# Patient Record
Sex: FEMALE | Race: BLACK OR AFRICAN AMERICAN | Employment: OTHER | URBAN - METROPOLITAN AREA
[De-identification: names, ages, dates, MRNs, and addresses within clinical notes are randomized per-mention and may not be internally consistent; named-entity substitution may affect disease eponyms.]

---

## 2019-02-21 ENCOUNTER — OFFICE VISIT (OUTPATIENT)
Dept: CARDIOLOGY CLINIC | Age: 69
End: 2019-02-21

## 2019-02-21 VITALS
HEIGHT: 60 IN | BODY MASS INDEX: 35.73 KG/M2 | HEART RATE: 75 BPM | WEIGHT: 182 LBS | SYSTOLIC BLOOD PRESSURE: 146 MMHG | RESPIRATION RATE: 18 BRPM | OXYGEN SATURATION: 97 % | DIASTOLIC BLOOD PRESSURE: 78 MMHG

## 2019-02-21 DIAGNOSIS — R07.9 CHEST PAIN, UNSPECIFIED TYPE: ICD-10-CM

## 2019-02-21 DIAGNOSIS — E11.9 TYPE 2 DIABETES MELLITUS WITHOUT COMPLICATION, WITHOUT LONG-TERM CURRENT USE OF INSULIN (HCC): ICD-10-CM

## 2019-02-21 DIAGNOSIS — I10 ESSENTIAL HYPERTENSION: Primary | Chronic | ICD-10-CM

## 2019-02-21 DIAGNOSIS — E78.2 MIXED HYPERLIPIDEMIA: ICD-10-CM

## 2019-02-21 DIAGNOSIS — R00.1 BRADYCARDIA: ICD-10-CM

## 2019-02-21 PROBLEM — E66.01 SEVERE OBESITY (HCC): Status: ACTIVE | Noted: 2019-02-21

## 2019-02-21 RX ORDER — ONDANSETRON 4 MG/1
4 TABLET, FILM COATED ORAL
COMMUNITY
End: 2021-03-04 | Stop reason: ALTCHOICE

## 2019-02-21 RX ORDER — TIZANIDINE 4 MG/1
4 TABLET ORAL 2 TIMES WEEKLY
COMMUNITY
End: 2021-03-04 | Stop reason: ALTCHOICE

## 2019-02-21 RX ORDER — ASPIRIN 81 MG/1
TABLET ORAL DAILY
COMMUNITY

## 2019-02-21 RX ORDER — ERGOCALCIFEROL 1.25 MG/1
50000 CAPSULE ORAL
COMMUNITY
End: 2022-03-18 | Stop reason: SDUPTHER

## 2019-02-21 NOTE — PROGRESS NOTES
Garrett Sargent is a 70 y.o. female    Chief Complaint   Patient presents with    Follow-up     chest discomfort       1. Have you been to the ER, urgent care clinic since your last visit? Hospitalized since your last visit? No    2. Have you seen or consulted any other health care providers outside of the [address] [place] [address] since your last visit? Include any pap smears or colon screening.  No    PCP Dr. Modesto Groves is sending pt for CT exam for possible \"cancerous growth in ABD\"

## 2019-02-21 NOTE — PATIENT INSTRUCTIONS
Change Metoprolol XL to 25mg po daily. Start Amlodipine 5mg po daily. See Dr. Ever Beaulieu in 1 year.

## 2019-02-21 NOTE — PROGRESS NOTES
Reason for Consult: Chest discomfort. Referring: Magdaleno Proctor MD    HPI: Chris Haegn is a 70 y.o. female who has known history of hypertension, diabetes, dyslipidemia who I have seen in the past and last seen in 2016 is here for evaluation of symptoms of chest discomfort and follow-up since she has been lost to follow-up since then. The symptoms described atypical nonexertional off and on mostly at rest.  There is no other symptoms or shortness of breath, lightheadedness, dizziness, presyncope or syncope. EKG in the office demonstrated sinus bradycardia with heart rate of 55 bpm with nonspecific ST changes with normal axis with normal intervals. Plan:    1. Chest discomfort: This is chronic. This is atypical.  She had a cardiac catheterization in February 2016 which was negative for coronary artery disease. Further ischemic testing at this time is not going to be much productive. Continue symptomatic management as well as risk factor modification. Control blood pressure, hypertension and diabetes. 2.  Hypertension: Continue metoprolol although we will decrease the dose to 25 mg p.o. daily since her heart rate is low. Since we are reducing the metoprolol we will start her on amlodipine 5 mg p.o. Daily. 3.  Bradycardia: Reduce the dose of metoprolol to 25 mg p.o. Daily. 4.  Dyslipidemia: Continue statins. 5.  Follow-up with me in 1 year.         Past Medical History:   Diagnosis Date    Arthritis     Diabetes ([address]     Hyperlipemia     Hypertension             Past Surgical History:   Procedure Laterality Date    HX CARPAL TUNNEL RELEASE      HX HEENT      HX HYSTERECTOMY      HX KNEE ARTHROSCOPY               Family History   Problem Relation Age of Onset    Hypertension Other     Diabetes Other            Social History     Socioeconomic History    Marital status: LEGALLY      Spouse name: Not on file    Number of children: Not on file    Years of education: Not on file    Highest education level: Not on file   Social Needs    Financial resource strain: Not on file    Food insecurity - worry: Not on file    Food insecurity - inability: Not on file    Transportation needs - medical: Not on file   [employer] needs - non-medical: Not on file   Occupational History    Not on file   Tobacco Use    Smoking status: Former Smoker     Last attempt to quit: 1996     Years since quittin.0    Smokeless tobacco: Never Used   Substance and Sexual Activity    Alcohol use: No    Drug use: No    Sexual activity: Not on file   Other Topics Concern    Not on file   Social History Narrative    Not on file         Allergies   Allergen Reactions    Clindamycin Itching    Codeine Itching            Current Outpatient Medications   Medication Sig Dispense Refill    aspirin delayed-release 81 mg tablet Take  by mouth daily.  ergocalciferol (ERGOCALCIFEROL) 50,000 unit capsule Take 50,000 Units by mouth.  tiZANidine (ZANAFLEX) 4 mg tablet Take 4 mg by mouth two (2) times a week.  ondansetron hcl (ZOFRAN) 4 mg tablet Take 4 mg by mouth every eight (8) hours as needed for Nausea.  metoprolol succinate (TOPROL-XL) 50 mg XL tablet Take 50 mg by mouth nightly.  ipratropium (ATROVENT) 0.06 % nasal spray 2 Sprays by Both Nostrils route four (4) times daily as needed.  ranitidine (ZANTAC) 300 mg tablet Take 300 mg by mouth nightly.  losartan-hydrochlorothiazide (HYZAAR) 100-12.5 mg per tablet Take 1 Tab by mouth nightly.  simvastatin (ZOCOR) 20 mg tablet Take 20 mg by mouth nightly.  metFORMIN (GLUCOPHAGE) 500 mg tablet Take 500 mg by mouth two (2) times daily (with meals).  aspirin (ASPIRIN) 325 mg tablet Take 325 mg by mouth nightly.  latanoprost (XALATAN) 0.005 % ophthalmic solution Administer 1 Drop to both eyes daily.       L. acidoph & paracasei- S therm- Bifido (JEANNETTE-Q) 8 billion cell cap cap Take 1 Cap by mouth daily.      traMADol (ULTRAM) 50 mg tablet Take 50 mg by mouth daily as needed for Pain. ROS:  12 point review of systems was performed.  All negative except for HPI     Physical Exam:  Visit Vitals  /78 (BP 1 Location: Right arm, BP Patient Position: Sitting)   Pulse 75   Resp 18   Ht 5' (1.524 m)   Wt 182 lb (82.6 kg)   SpO2 97%   BMI 35.54 kg/m²       Gen:  Well-developed, well-nourished, in no acute distress  HEENT:  Pink conjunctivae, PERRL, hearing intact to voice, moist mucous membranes  Neck:  Supple, without masses, thyroid non-tender  Resp:  No accessory muscle use, clear breath sounds without wheezes rales or rhonchi  Card:  No murmurs, normal S1, S2 without thrills, bruits or peripheral edema  Abd:  Soft, non-tender, non-distended, normoactive bowel sounds are present, no palpable organomegaly and no detectable hernias  Lymph:  No cervical or inguinal adenopathy  Musc:  No cyanosis or clubbing  Skin:  No rashes or ulcers, skin turgor is good  Neuro:  Cranial nerves are grossly intact, no focal motor weakness, follows commands appropriately  Psych:  Good insight, oriented to person, place and time, alert     Labs:     Lab Results   Component Value Date/Time    WBC 7.9 02/03/2016 06:30 AM    HGB 12.2 02/03/2016 06:30 AM    Hemoglobin (POC) 13.9 12/20/2014 10:20 AM    HCT 36.5 02/03/2016 06:30 AM    Hematocrit (POC) 41 12/20/2014 10:20 AM    PLATELET 514 86/54/1314 06:30 AM    MCV 83.7 02/03/2016 06:30 AM     Lab Results   Component Value Date/Time    Hemoglobin A1c 6.5 (H) 02/03/2016 06:30 AM    Glucose 117 (H) 02/03/2016 06:30 AM    Glucose (POC) 100 02/03/2016 12:50 PM    LDL, calculated 81.8 02/03/2016 06:30 AM    Creatinine (POC) 1.0 12/20/2014 10:20 AM    Creatinine 0.91 02/03/2016 06:30 AM      Lab Results   Component Value Date/Time    Cholesterol, total 154 02/03/2016 06:30 AM    HDL Cholesterol 45 02/03/2016 06:30 AM    LDL, calculated 81.8 02/03/2016 06:30 AM    Triglyceride 136 02/03/2016 06:30 AM    CHOL/HDL Ratio 3.4 02/03/2016 06:30 AM     Lab Results   Component Value Date/Time    ALT (SGPT) 16 02/03/2016 06:30 AM    AST (SGOT) 13 (L) 02/03/2016 06:30 AM    Alk.  phosphatase 84 02/03/2016 06:30 AM    Bilirubin, direct 0.2 02/03/2016 06:30 AM    Bilirubin, total 1.0 02/03/2016 06:30 AM    Albumin 3.2 (L) 02/03/2016 06:30 AM    Protein, total 6.1 (L) 02/03/2016 06:30 AM    PLATELET 533 98/11/4624 06:30 AM     No results found for: INR, PTMR, PTP, PT1, PT2   Lab Results   Component Value Date/Time    GFR est non-AA >60 02/03/2016 06:30 AM    GFRNA, POC 56 (L) 12/20/2014 10:20 AM    GFR est AA >60 02/03/2016 06:30 AM    GFRAA, POC >60 12/20/2014 10:20 AM    Creatinine 0.91 02/03/2016 06:30 AM    Creatinine (POC) 1.0 12/20/2014 10:20 AM    BUN 18 02/03/2016 06:30 AM    BUN (POC) 17 12/20/2014 10:20 AM    Sodium 142 02/03/2016 06:30 AM    Sodium (POC) 138 12/20/2014 10:20 AM    Potassium 3.7 02/03/2016 06:30 AM    Potassium (POC) 3.7 12/20/2014 10:20 AM    Chloride 106 02/03/2016 06:30 AM    Chloride (POC) 101 12/20/2014 10:20 AM    CO2 29 02/03/2016 06:30 AM    Magnesium 1.9 02/03/2016 06:30 AM    Phosphorus 3.9 02/03/2016 06:30 AM     No results found for: PSA, PSA2, PSAR1, PSA1, PSAR2, PSA3, PSAR3, [id number], [id number], PSALT  No results found for: TSH, TSH2, TSH3, TSHP, TSHELE, TSHEXT, TT3, T3U, T3UP, FRT3, FT3, FT4, FT4P, T4, T4P, FT4T, TT7, TSHEXT   Lab Results   Component Value Date/Time    Glucose 117 (H) 02/03/2016 06:30 AM    Glucose (POC) 100 02/03/2016 12:50 PM      Lab Results   Component Value Date/Time    CK 68 02/03/2016 06:30 AM    CK - MB 0.7 02/03/2016 06:30 AM    CK-MB Index 1.0 02/03/2016 06:30 AM    Troponin-I, Qt. <0.04 02/03/2016 06:30 AM      No results found for: BNP, BNPP, BNPPPOC, XBNPT, BNPNT   Lab Results   Component Value Date/Time    Sodium 142 02/03/2016 06:30 AM    Potassium 3.7 02/03/2016 06:30 AM    Chloride 106 02/03/2016 06:30 AM    CO2 29 02/03/2016 06:30 AM    Anion gap 7 02/03/2016 06:30 AM    Glucose 117 (H) 02/03/2016 06:30 AM    BUN 18 02/03/2016 06:30 AM    Creatinine 0.91 02/03/2016 06:30 AM    BUN/Creatinine ratio 20 02/03/2016 06:30 AM    GFR est AA >60 02/03/2016 06:30 AM    GFR est non-AA >60 02/03/2016 06:30 AM    Calcium 8.6 02/03/2016 06:30 AM      Lab Results   Component Value Date/Time    Sodium 142 02/03/2016 06:30 AM    Potassium 3.7 02/03/2016 06:30 AM    Chloride 106 02/03/2016 06:30 AM    CO2 29 02/03/2016 06:30 AM    Anion gap 7 02/03/2016 06:30 AM    Glucose 117 (H) 02/03/2016 06:30 AM    BUN 18 02/03/2016 06:30 AM    Creatinine 0.91 02/03/2016 06:30 AM    BUN/Creatinine ratio 20 02/03/2016 06:30 AM    GFR est AA >60 02/03/2016 06:30 AM    GFR est non-AA >60 02/03/2016 06:30 AM    Calcium 8.6 02/03/2016 06:30 AM    Bilirubin, total 1.0 02/03/2016 06:30 AM    ALT (SGPT) 16 02/03/2016 06:30 AM    AST (SGOT) 13 (L) 02/03/2016 06:30 AM    Alk. phosphatase 84 02/03/2016 06:30 AM    Protein, total 6.1 (L) 02/03/2016 06:30 AM    Albumin 3.2 (L) 02/03/2016 06:30 AM    Globulin 2.9 02/03/2016 06:30 AM    A-G Ratio 1.1 02/03/2016 06:30 AM      Lab Results   Component Value Date/Time    Hemoglobin A1c 6.5 (H) 02/03/2016 06:30 AM         No results for input(s): CPK, CKMB, TROIQ in the last 72 hours. No lab exists for component: CKQMB, CPKMB        Problem List:     Problem List  Date Reviewed: 2/22/2016          Codes Class Noted    Severe obesity ([facility] ICD-10-CM: E66.01  ICD-9-CM: 278.01  2/21/2019        Hyperlipemia ICD-10-CM: E78.5  ICD-9-CM: 272.4  Unknown        Arthritis ICD-10-CM: M19.90  ICD-9-CM: 716.90  Unknown        Chest pain ICD-10-CM: R07.9  ICD-9-CM: 786.50  2/2/2016        Hypertension (Chronic) ICD-10-CM: I10  ICD-9-CM: 401.9  2/2/2016        DM type 2 (diabetes mellitus, type 2) ([facility] (Chronic) ICD-10-CM: E11.9  ICD-9-CM: 250.00  2/2/2016                Omi Oakley MD, [facility] [place]

## 2019-02-22 RX ORDER — AMLODIPINE BESYLATE 5 MG/1
5 TABLET ORAL DAILY
Qty: 90 TAB | Refills: 0 | Status: SHIPPED | OUTPATIENT
Start: 2019-02-22 | End: 2019-05-26 | Stop reason: SDUPTHER

## 2019-02-25 ENCOUNTER — TELEPHONE (OUTPATIENT)
Dept: CARDIOLOGY CLINIC | Age: 69
End: 2019-02-25

## 2019-02-25 RX ORDER — METOPROLOL SUCCINATE 25 MG/1
25 TABLET, EXTENDED RELEASE ORAL
Qty: 90 TAB | Refills: 3 | Status: SHIPPED | OUTPATIENT
Start: 2019-02-25

## 2019-02-25 NOTE — TELEPHONE ENCOUNTER
Patient states Dr. Jet Verdugo changed the dosage on her metoprolol, but her pharmacy did not receive the new prescription.      Phone: [phone]

## 2019-05-28 RX ORDER — AMLODIPINE BESYLATE 5 MG/1
TABLET ORAL
Qty: 90 TAB | Refills: 3 | Status: SHIPPED | OUTPATIENT
Start: 2019-05-28 | End: 2020-03-02

## 2019-05-28 NOTE — TELEPHONE ENCOUNTER
Refill of amlodipine 5 mg daily completed per VO of Dr. Chidi Arias.     Last OV: 2/2019  Next OV: 2/2020

## 2020-02-27 ENCOUNTER — OFFICE VISIT (OUTPATIENT)
Dept: CARDIOLOGY CLINIC | Age: 70
End: 2020-02-27

## 2020-02-27 VITALS
OXYGEN SATURATION: 93 % | SYSTOLIC BLOOD PRESSURE: 126 MMHG | BODY MASS INDEX: 34.63 KG/M2 | HEART RATE: 67 BPM | HEIGHT: 60 IN | WEIGHT: 176.4 LBS | DIASTOLIC BLOOD PRESSURE: 72 MMHG

## 2020-02-27 DIAGNOSIS — E11.9 TYPE 2 DIABETES MELLITUS WITHOUT COMPLICATION, WITHOUT LONG-TERM CURRENT USE OF INSULIN (HCC): ICD-10-CM

## 2020-02-27 DIAGNOSIS — E78.2 MIXED HYPERLIPIDEMIA: ICD-10-CM

## 2020-02-27 DIAGNOSIS — R00.1 BRADYCARDIA: ICD-10-CM

## 2020-02-27 DIAGNOSIS — I10 ESSENTIAL HYPERTENSION: Primary | ICD-10-CM

## 2020-02-27 RX ORDER — PANTOPRAZOLE SODIUM 40 MG/1
TABLET, DELAYED RELEASE ORAL
COMMUNITY
End: 2022-03-18

## 2020-02-27 NOTE — PROGRESS NOTES
Office Follow-up    NAME: Linda Daly   :  1950  MRM:  [id number]    Date:  2020            Assessment:     Problem List  Date Reviewed: 2020          Codes Class Noted    Severe obesity ([facility] ICD-10-CM: E66.01  ICD-9-CM: 278.01  2019        Hyperlipemia ICD-10-CM: E78.5  ICD-9-CM: 272.4  Unknown        Arthritis ICD-10-CM: M19.90  ICD-9-CM: 716.90  Unknown        Chest pain ICD-10-CM: R07.9  ICD-9-CM: 786.50  2016        Hypertension (Chronic) ICD-10-CM: I10  ICD-9-CM: 401.9  2016        DM type 2 (diabetes mellitus, type 2) ([facility] (Chronic) ICD-10-CM: E11.9  ICD-9-CM: 250.00  2016                 Plan:   Plan:    1. Chest discomfort: This is chronic. This is atypical.  She had a cardiac catheterization in 2016 which was negative for coronary artery disease. Continue current meds. Control blood pressure, hypertension and diabetes. 2.  Hypertension: Continue metoprolol and amlodipine. In the past we had reduced metoprolol dosage because of bradycardia. Her heart rate has since then improved. 3.  Bradycardia: This is improved since reducing the dosage of metoprolol to 25 mg p.o. daily. 4.  Dyslipidemia: Continue statins. 5.  Follow-up with me in 1 year. ATTENTION:   This medical record was transcribed using an electronic medical records/speech recognition system. Although proofread, it may and can contain electronic, spelling and other errors. Corrections may be executed at a later time. Please feel free to contact us for any clarifications as needed. Subjective:     Linda Daly, a 78y.o. year-old who presents for followup of. She has history of hypertension, dyslipidemia and bradycardia. She has chronic atypical chest pain. She is returning today for one-year follow-up. Denies any symptoms of chest pain, shortness of breath, lightheadedness or dizziness.     EKG today demonstrated normal sinus rhythm with normal axis with normal intervals with normal ST segment. Exam:     Physical Exam:  Visit Vitals  /72 (BP 1 Location: Left arm, BP Patient Position: Sitting)   Pulse 67   Ht 5' (1.524 m)   Wt 176 lb 6.4 oz (80 kg)   SpO2 93%   BMI 34.45 kg/m²     General appearance - alert, well appearing, and in no distress  Mental status - affect appropriate to mood  Eyes - sclera anicteric, moist mucous membranes  Neck - supple, no significant adenopathy  Chest - clear to auscultation, no wheezes, rales or rhonchi  Heart - normal rate, regular rhythm, normal S1, S2, no murmurs, rubs, clicks or gallops  Abdomen - soft, nontender, nondistended, no masses or organomegaly  Extremities - peripheral pulses normal, no pedal edema  Skin - normal coloration  no rashes    Medications:     Current Outpatient Medications   Medication Sig    pantoprazole (PROTONIX) 40 mg tablet pantoprazole 40 mg tablet,delayed release    amLODIPine (NORVASC) 5 mg tablet take 1 tablet by mouth once daily    metoprolol succinate (TOPROL-XL) 25 mg XL tablet Take 1 Tab by mouth nightly.  aspirin delayed-release 81 mg tablet Take  by mouth daily.  ergocalciferol (ERGOCALCIFEROL) 50,000 unit capsule Take 50,000 Units by mouth.  tiZANidine (ZANAFLEX) 4 mg tablet Take 4 mg by mouth two (2) times a week.  ondansetron hcl (ZOFRAN) 4 mg tablet Take 4 mg by mouth every eight (8) hours as needed for Nausea.  aspirin (ASPIRIN) 325 mg tablet Take 325 mg by mouth nightly.  latanoprost (XALATAN) 0.005 % ophthalmic solution Administer 1 Drop to both eyes daily.  L. acidoph & paracasei- S therm- Bifido (JEANNETTE-Q) 8 billion cell cap cap Take 1 Cap by mouth daily.  traMADol (ULTRAM) 50 mg tablet Take 50 mg by mouth daily as needed for Pain.  ipratropium (ATROVENT) 0.06 % nasal spray 2 Sprays by Both Nostrils route four (4) times daily as needed.  losartan-hydrochlorothiazide (HYZAAR) 100-12.5 mg per tablet Take 1 Tab by mouth nightly.     simvastatin (ZOCOR) 20 mg tablet Take 20 mg by mouth nightly.  metFORMIN (GLUCOPHAGE) 500 mg tablet Take 500 mg by mouth two (2) times daily (with meals).  ranitidine (ZANTAC) 300 mg tablet Take 300 mg by mouth nightly. No current facility-administered medications for this visit. Diagnostic Data Review:       [facility]: 2/3/16- L Main: Med; Long; NML. LAD: Med prox/ Small distal ; MLI; D1 nml. LCflex: Med; MLI; OM1/OM2 - MLI. RCA: Small to Med; MLI; PDA and PLB - small. LVEDP: 14      Lab Review:     Lab Results   Component Value Date/Time    Cholesterol, total 154 02/03/2016 06:30 AM    HDL Cholesterol 45 02/03/2016 06:30 AM    LDL, calculated 81.8 02/03/2016 06:30 AM    Triglyceride 136 02/03/2016 06:30 AM    CHOL/HDL Ratio 3.4 02/03/2016 06:30 AM     Lab Results   Component Value Date/Time    Creatinine (POC) 1.0 12/20/2014 10:20 AM    Creatinine 0.91 02/03/2016 06:30 AM     Lab Results   Component Value Date/Time    BUN 18 02/03/2016 06:30 AM    BUN (POC) 17 12/20/2014 10:20 AM     Lab Results   Component Value Date/Time    Potassium 3.7 02/03/2016 06:30 AM     Lab Results   Component Value Date/Time    Hemoglobin A1c 6.5 (H) 02/03/2016 06:30 AM     Lab Results   Component Value Date/Time    Hemoglobin (POC) 13.9 12/20/2014 10:20 AM    HGB 12.2 02/03/2016 06:30 AM     Lab Results   Component Value Date/Time    PLATELET 194 42/52/8846 06:30 AM     No results for input(s): CPK, CKMB, TROIQ in the last 72 hours. No lab exists for component: CKQMB, CPKMB             ___________________________________________________    Cuco Jarvis.  Cm Rowell MD, [facility] [place]

## 2020-02-27 NOTE — PROGRESS NOTES
Day Cho is a 78 y.o. female    Chief Complaint   Patient presents with    Annual Exam     horacio    Hypertension    Cholesterol Problem       Chest pain No    SOB No    Dizziness No    Swelling No    Refills amlodipine and metorolol    Visit Vitals  /72 (BP 1 Location: Left arm, BP Patient Position: Sitting)   Pulse 67   Ht 5' (1.524 m)   Wt 176 lb 6.4 oz (80 kg)   SpO2 93%   BMI 34.45 kg/m²       1. Have you been to the ER, urgent care clinic since your last visit? Hospitalized since your last visit? No    2. Have you seen or consulted any other health care providers outside of the [address] [place] [address] since your last visit? Include any pap smears or colon screening.   No

## 2020-03-02 RX ORDER — AMLODIPINE BESYLATE 5 MG/1
TABLET ORAL
Qty: 90 TAB | Refills: 3 | Status: SHIPPED | OUTPATIENT
Start: 2020-03-02

## 2020-03-02 NOTE — TELEPHONE ENCOUNTER
Per VO of Dr. Sofia Garcia: 2/21/2019    Future Appointments   Date Time Provider Hany Linton   3/4/2021 10:20 AM Marina Diaz MD [facility] SCHED       Requested Prescriptions     Signed Prescriptions Disp Refills    amLODIPine (NORVASC) 5 mg tablet 90 Tab 3     Sig: TAKE 1 TABLET BY MOUTH ONCE DAILY     Authorizing Provider: Srinivas Devi     Ordering User: Bella Elliott

## 2021-03-04 ENCOUNTER — TELEPHONE (OUTPATIENT)
Dept: CARDIOLOGY CLINIC | Age: 71
End: 2021-03-04

## 2021-03-04 ENCOUNTER — OFFICE VISIT (OUTPATIENT)
Dept: CARDIOLOGY CLINIC | Age: 71
End: 2021-03-04
Payer: MEDICARE

## 2021-03-04 VITALS
SYSTOLIC BLOOD PRESSURE: 130 MMHG | HEART RATE: 55 BPM | DIASTOLIC BLOOD PRESSURE: 76 MMHG | BODY MASS INDEX: 34.36 KG/M2 | OXYGEN SATURATION: 97 % | HEIGHT: 60 IN | WEIGHT: 175 LBS

## 2021-03-04 DIAGNOSIS — E78.2 MIXED HYPERLIPIDEMIA: ICD-10-CM

## 2021-03-04 DIAGNOSIS — I10 ESSENTIAL HYPERTENSION: ICD-10-CM

## 2021-03-04 DIAGNOSIS — R00.1 BRADYCARDIA: Primary | ICD-10-CM

## 2021-03-04 PROCEDURE — 3017F COLORECTAL CA SCREEN DOC REV: CPT | Performed by: INTERNAL MEDICINE

## 2021-03-04 PROCEDURE — G8419 CALC BMI OUT NRM PARAM NOF/U: HCPCS | Performed by: INTERNAL MEDICINE

## 2021-03-04 PROCEDURE — G8752 SYS BP LESS 140: HCPCS | Performed by: INTERNAL MEDICINE

## 2021-03-04 PROCEDURE — G8428 CUR MEDS NOT DOCUMENT: HCPCS | Performed by: INTERNAL MEDICINE

## 2021-03-04 PROCEDURE — G8400 PT W/DXA NO RESULTS DOC: HCPCS | Performed by: INTERNAL MEDICINE

## 2021-03-04 PROCEDURE — 1101F PT FALLS ASSESS-DOCD LE1/YR: CPT | Performed by: INTERNAL MEDICINE

## 2021-03-04 PROCEDURE — G8754 DIAS BP LESS 90: HCPCS | Performed by: INTERNAL MEDICINE

## 2021-03-04 PROCEDURE — G8536 NO DOC ELDER MAL SCRN: HCPCS | Performed by: INTERNAL MEDICINE

## 2021-03-04 PROCEDURE — 1090F PRES/ABSN URINE INCON ASSESS: CPT | Performed by: INTERNAL MEDICINE

## 2021-03-04 PROCEDURE — 99214 OFFICE O/P EST MOD 30 MIN: CPT | Performed by: INTERNAL MEDICINE

## 2021-03-04 PROCEDURE — G8432 DEP SCR NOT DOC, RNG: HCPCS | Performed by: INTERNAL MEDICINE

## 2021-03-04 PROCEDURE — 93000 ELECTROCARDIOGRAM COMPLETE: CPT | Performed by: INTERNAL MEDICINE

## 2021-03-04 RX ORDER — FLUOCINONIDE 0.5 MG/G
CREAM TOPICAL 2 TIMES DAILY
COMMUNITY

## 2021-03-04 RX ORDER — LISINOPRIL AND HYDROCHLOROTHIAZIDE 20; 25 MG/1; MG/1
TABLET ORAL DAILY
COMMUNITY
End: 2021-05-03 | Stop reason: ALTCHOICE

## 2021-03-04 RX ORDER — GABAPENTIN 300 MG/1
300 CAPSULE ORAL AS NEEDED
COMMUNITY

## 2021-03-04 RX ORDER — HYDROXYZINE 25 MG/1
TABLET, FILM COATED ORAL
COMMUNITY

## 2021-03-04 NOTE — PROGRESS NOTES
Gulshan Renteria is a 69 y.o. female    Visit Vitals  /76 (BP 1 Location: Left upper arm, BP Patient Position: Sitting, BP Cuff Size: Adult)   Pulse (!) 55   Ht 5' (1.524 m)   Wt 175 lb (79.4 kg)   SpO2 97%   BMI 34.18 kg/m²       Chief Complaint   Patient presents with    Hypertension    Cholesterol Problem    Irregular Heart Beat     SHERRI       Chest pain NO  SOB NO  Dizziness NO  Swelling NO  Recent hospital visit NO  Refills NO

## 2021-03-04 NOTE — TELEPHONE ENCOUNTER
Patient would like to speak with the urse to go over the list of current medications she is on per Dr. Derek Winslow request. Please advise.      Phone:  [phone]

## 2021-03-04 NOTE — TELEPHONE ENCOUNTER
Returned pt call, ID [id number]. Pt stated that she had already spoken toa nurse to review medication. Pt has no further questions or concerns at this time.

## 2021-03-04 NOTE — PROGRESS NOTES
Office Follow-up    NAME: Anaya Musa   :  1950  MRM:  [id number]    Date:  3/4/2021            Assessment:     Problem List  Date Reviewed: 2020          Codes Class Noted    Severe obesity ([facility].) ICD-10-CM: E66.01  ICD-9-CM: 278.01  2019        Hyperlipemia ICD-10-CM: E78.5  ICD-9-CM: 272.4  Unknown        Arthritis ICD-10-CM: M19.90  ICD-9-CM: 716.90  Unknown        Chest pain ICD-10-CM: R07.9  ICD-9-CM: 786.50  2016        Hypertension (Chronic) ICD-10-CM: I10  ICD-9-CM: 401.9  2016        DM type 2 (diabetes mellitus, type 2) ([facility].) (Chronic) ICD-10-CM: E11.9  ICD-9-CM: 250.00  2016                 Plan:   Plan:    1. Chest discomfort: Currently she does not have any significant chest pain. In past she has had cardiac catheterization in 2016 with negative CAD. 2.  Hypertension: Blood pressure is controlled. Continue current medications however her list describes having lisinopril HCTZ as well as losartan HCTZ. She does not recall if she is taking both of them. She will call her office and let us know which when she is taking. 3.  Bradycardia: This is improved since reducing the dosage of metoprolol to 25 mg p.o. daily. 4.  Dyslipidemia: Continue statins. 5.  Follow-up with me in 1 year. ATTENTION:   This medical record was transcribed using an electronic medical records/speech recognition system. Although proofread, it may and can contain electronic, spelling and other errors. Corrections may be executed at a later time. Please feel free to contact us for any clarifications as needed. Subjective:     Anaya Musa, a 69y.o. year-old who presents for followup. She has prior history of hypertension, dyslipidemia, bradycardia, PVCs, and chest pain. She is returning today for 1 year follow-up. Denies any symptoms of chest pain, shortness of breath, lightheadedness or dizziness.     EKG today demonstrates sinus bradycardia with heart rate of 55 bpm with occasional PVC. Exam:     Physical Exam:  Visit Vitals  /76 (BP 1 Location: Left upper arm, BP Patient Position: Sitting, BP Cuff Size: Adult)   Pulse (!) 55   Ht 5' (1.524 m)   Wt 175 lb (79.4 kg)   SpO2 97%   BMI 34.18 kg/m²     General appearance - alert, well appearing, and in no distress  Mental status - affect appropriate to mood  Eyes - sclera anicteric, moist mucous membranes  Neck - supple, no significant adenopathy  Chest - clear to auscultation, no wheezes, rales or rhonchi  Heart - normal rate, regular rhythm, normal S1, S2, no murmurs, rubs, clicks or gallops  Abdomen - soft, nontender, nondistended, no masses or organomegaly  Extremities - peripheral pulses normal, no pedal edema  Skin - normal coloration  no rashes    Medications:     Current Outpatient Medications   Medication Sig    lisinopril-hydroCHLOROthiazide (PRINZIDE, ZESTORETIC) 20-25 mg per tablet Take  by mouth daily.  hydrOXYzine HCL (ATARAX) 25 mg tablet Take  by mouth three (3) times daily as needed.  gabapentin (NEURONTIN) 300 mg capsule Take 300 mg by mouth three (3) times daily.  fluocinoNIDE (LIDEX) 0.05 % topical cream Apply  to affected area two (2) times a day.  amLODIPine (NORVASC) 5 mg tablet TAKE 1 TABLET BY MOUTH ONCE DAILY    pantoprazole (PROTONIX) 40 mg tablet pantoprazole 40 mg tablet,delayed release    metoprolol succinate (TOPROL-XL) 25 mg XL tablet Take 1 Tab by mouth nightly.  aspirin delayed-release 81 mg tablet Take  by mouth daily.  ergocalciferol (ERGOCALCIFEROL) 50,000 unit capsule Take 50,000 Units by mouth.  latanoprost (XALATAN) 0.005 % ophthalmic solution Administer 1 Drop to both eyes daily.  ipratropium (ATROVENT) 0.06 % nasal spray 2 Sprays by Both Nostrils route four (4) times daily as needed.  losartan-hydrochlorothiazide (HYZAAR) 100-12.5 mg per tablet Take 1 Tab by mouth nightly.  simvastatin (ZOCOR) 20 mg tablet Take 20 mg by mouth nightly.     metFORMIN (GLUCOPHAGE) 500 mg tablet Take 500 mg by mouth two (2) times daily (with meals).  tiZANidine (ZANAFLEX) 4 mg tablet Take 4 mg by mouth two (2) times a week.  ondansetron hcl (ZOFRAN) 4 mg tablet Take 4 mg by mouth every eight (8) hours as needed for Nausea.  L. acidoph & paracasei- S therm- Bifido (JEANNETTE-Q) 8 billion cell cap cap Take 1 Cap by mouth daily.  traMADol (ULTRAM) 50 mg tablet Take 50 mg by mouth daily as needed for Pain. No current facility-administered medications for this visit. Diagnostic Data Review:       [facility]: 2/3/16- L Main: Med; Long; NML. LAD: Med prox/ Small distal ; MLI; D1 nml. LCflex: Med; MLI; OM1/OM2 - MLI. RCA: Small to Med; MLI; PDA and PLB - small. LVEDP: 14      Lab Review:     Lab Results   Component Value Date/Time    Cholesterol, total 154 02/03/2016 06:30 AM    HDL Cholesterol 45 02/03/2016 06:30 AM    LDL, calculated 81.8 02/03/2016 06:30 AM    Triglyceride 136 02/03/2016 06:30 AM    CHOL/HDL Ratio 3.4 02/03/2016 06:30 AM     Lab Results   Component Value Date/Time    Creatinine (POC) 1.0 12/20/2014 10:20 AM    Creatinine 0.91 02/03/2016 06:30 AM     Lab Results   Component Value Date/Time    BUN 18 02/03/2016 06:30 AM    BUN (POC) 17 12/20/2014 10:20 AM     Lab Results   Component Value Date/Time    Potassium 3.7 02/03/2016 06:30 AM     Lab Results   Component Value Date/Time    Hemoglobin A1c 6.5 (H) 02/03/2016 06:30 AM     Lab Results   Component Value Date/Time    Hemoglobin (POC) 13.9 12/20/2014 10:20 AM    HGB 12.2 02/03/2016 06:30 AM     Lab Results   Component Value Date/Time    PLATELET 881 50/62/9473 06:30 AM     No results for input(s): CPK, CKMB, TROIQ in the last 72 hours. No lab exists for component: CKQMB, CPKMB             ___________________________________________________    Lisa Gumaan.  Foreign Edwards MD, [facility] [place]

## 2021-04-29 ENCOUNTER — OFFICE VISIT (OUTPATIENT)
Dept: CARDIOLOGY CLINIC | Age: 71
End: 2021-04-29
Payer: MEDICARE

## 2021-04-29 VITALS
OXYGEN SATURATION: 98 % | RESPIRATION RATE: 16 BRPM | HEIGHT: 60 IN | HEART RATE: 68 BPM | WEIGHT: 177 LBS | SYSTOLIC BLOOD PRESSURE: 156 MMHG | DIASTOLIC BLOOD PRESSURE: 74 MMHG | BODY MASS INDEX: 34.75 KG/M2

## 2021-04-29 DIAGNOSIS — E78.2 MIXED HYPERLIPIDEMIA: ICD-10-CM

## 2021-04-29 DIAGNOSIS — I10 ESSENTIAL HYPERTENSION: Primary | ICD-10-CM

## 2021-04-29 DIAGNOSIS — R07.9 CHEST PAIN, UNSPECIFIED TYPE: ICD-10-CM

## 2021-04-29 PROCEDURE — G8400 PT W/DXA NO RESULTS DOC: HCPCS | Performed by: INTERNAL MEDICINE

## 2021-04-29 PROCEDURE — G8536 NO DOC ELDER MAL SCRN: HCPCS | Performed by: INTERNAL MEDICINE

## 2021-04-29 PROCEDURE — 99214 OFFICE O/P EST MOD 30 MIN: CPT | Performed by: INTERNAL MEDICINE

## 2021-04-29 PROCEDURE — G8754 DIAS BP LESS 90: HCPCS | Performed by: INTERNAL MEDICINE

## 2021-04-29 PROCEDURE — 3017F COLORECTAL CA SCREEN DOC REV: CPT | Performed by: INTERNAL MEDICINE

## 2021-04-29 PROCEDURE — G8432 DEP SCR NOT DOC, RNG: HCPCS | Performed by: INTERNAL MEDICINE

## 2021-04-29 PROCEDURE — G8753 SYS BP > OR = 140: HCPCS | Performed by: INTERNAL MEDICINE

## 2021-04-29 PROCEDURE — 1101F PT FALLS ASSESS-DOCD LE1/YR: CPT | Performed by: INTERNAL MEDICINE

## 2021-04-29 PROCEDURE — G8428 CUR MEDS NOT DOCUMENT: HCPCS | Performed by: INTERNAL MEDICINE

## 2021-04-29 PROCEDURE — G8417 CALC BMI ABV UP PARAM F/U: HCPCS | Performed by: INTERNAL MEDICINE

## 2021-04-29 PROCEDURE — 1090F PRES/ABSN URINE INCON ASSESS: CPT | Performed by: INTERNAL MEDICINE

## 2021-04-29 NOTE — PROGRESS NOTES
Office Follow-up    NAME: Chaz Cuellar   :  1950  MRM:  [id number]    Date:  2021            Assessment:     Problem List  Date Reviewed: 2020          Codes Class Noted    Severe obesity ([facility].) ICD-10-CM: E66.01  ICD-9-CM: 278.01  2019        Hyperlipemia ICD-10-CM: E78.5  ICD-9-CM: 272.4  Unknown        Arthritis ICD-10-CM: M19.90  ICD-9-CM: 716.90  Unknown        Chest pain ICD-10-CM: R07.9  ICD-9-CM: 786.50  2016        Hypertension (Chronic) ICD-10-CM: I10  ICD-9-CM: 401.9  2016        DM type 2 (diabetes mellitus, type 2) ([facility] 75.) (Chronic) ICD-10-CM: E11.9  ICD-9-CM: 250.00  2016                 Plan:     1. Chest discomfort: In past she has had a cardiac catheterization in  which only demonstrated mild luminal irregularities. She does have risk factors including her personal history of hypertension and dyslipidemia as well as family history of her father having triple bypass. We will proceed with a Lexiscan stress nuclear study. 2.  Hypertension: Blood pressure is mostly controlled although slightly elevated today. Continue lisinopril and HCTZ. There is some confusion about she being on also losartan HCTZ. 3.  Bradycardia: This is improved since reducing the dosage of metoprolol to 25 mg p.o. daily. 4.  Dyslipidemia: Continue statins. 5.  Phone follow-up of the rate stress test.  Otherwise follow-up with me in 1 year. ATTENTION:   This medical record was transcribed using an electronic medical records/speech recognition system. Although proofread, it may and can contain electronic, spelling and other errors. Corrections may be executed at a later time. Please feel free to contact us for any clarifications as needed. Subjective:     Chaz Cuellar, a 66y.o. year-old who presents for followup. She has prior history of hypertension, dyslipidemia, bradycardia, PVCs, and chest pain.   I had recently seen her about a month ago for follow-up at that time she was doing fine however in past few days she has started experiencing some chest discomfort which she describes as pins-and-needles or aching sensation sometimes in the anterior left chest wall especially when she is exerting herself. She also has noticed the symptoms when she is in stress. No associated symptoms of shortness of breath, lightheadedness or dizziness. EKG from 3/4/2021 demonstrates sinus bradycardia with heart rate of 55 bpm with occasional PVC. Exam:     Physical Exam:  Visit Vitals  BP (!) 156/74 (BP 1 Location: Left arm, BP Patient Position: Sitting, BP Cuff Size: Large adult)   Pulse 68   Resp 16   Ht 5' (1.524 m)   Wt 177 lb (80.3 kg)   SpO2 98%   BMI 34.57 kg/m²     General appearance - alert, well appearing, and in no distress  Mental status - affect appropriate to mood  Eyes - sclera anicteric, moist mucous membranes  Neck - supple, no significant adenopathy  Chest - clear to auscultation, no wheezes, rales or rhonchi  Heart - normal rate, regular rhythm, normal S1, S2, no murmurs, rubs, clicks or gallops  Abdomen - soft, nontender, nondistended, no masses or organomegaly  Extremities - peripheral pulses normal, no pedal edema  Skin - normal coloration  no rashes    Medications:     Current Outpatient Medications   Medication Sig    lisinopril-hydroCHLOROthiazide (PRINZIDE, ZESTORETIC) 20-25 mg per tablet Take  by mouth daily.  hydrOXYzine HCL (ATARAX) 25 mg tablet Take  by mouth three (3) times daily as needed.  gabapentin (NEURONTIN) 300 mg capsule Take 300 mg by mouth as needed.  fluocinoNIDE (LIDEX) 0.05 % topical cream Apply  to affected area two (2) times a day.  amLODIPine (NORVASC) 5 mg tablet TAKE 1 TABLET BY MOUTH ONCE DAILY    pantoprazole (PROTONIX) 40 mg tablet pantoprazole 40 mg tablet,delayed release    metoprolol succinate (TOPROL-XL) 25 mg XL tablet Take 1 Tab by mouth nightly.     aspirin delayed-release 81 mg tablet Take  by mouth daily.  ergocalciferol (ERGOCALCIFEROL) 50,000 unit capsule Take 50,000 Units by mouth.  latanoprost (XALATAN) 0.005 % ophthalmic solution Administer 1 Drop to both eyes daily.  ipratropium (ATROVENT) 0.06 % nasal spray 2 Sprays by Both Nostrils route four (4) times daily as needed.  simvastatin (ZOCOR) 20 mg tablet Take 20 mg by mouth nightly.  metFORMIN (GLUCOPHAGE) 500 mg tablet Take 500 mg by mouth two (2) times daily (with meals).  losartan-hydrochlorothiazide (HYZAAR) 100-12.5 mg per tablet Take 1 Tab by mouth nightly. No current facility-administered medications for this visit. Diagnostic Data Review:       [facility]: 2/3/16- L Main: Med; Long; NML. LAD: Med prox/ Small distal ; MLI; D1 nml. LCflex: Med; MLI; OM1/OM2 - MLI. RCA: Small to Med; MLI; PDA and PLB - small. LVEDP: 14      Lab Review:     Lab Results   Component Value Date/Time    Cholesterol, total 154 02/03/2016 06:30 AM    HDL Cholesterol 45 02/03/2016 06:30 AM    LDL, calculated 81.8 02/03/2016 06:30 AM    Triglyceride 136 02/03/2016 06:30 AM    CHOL/HDL Ratio 3.4 02/03/2016 06:30 AM     Lab Results   Component Value Date/Time    Creatinine (POC) 1.0 12/20/2014 10:20 AM    Creatinine 0.91 02/03/2016 06:30 AM     Lab Results   Component Value Date/Time    BUN 18 02/03/2016 06:30 AM    BUN (POC) 17 12/20/2014 10:20 AM     Lab Results   Component Value Date/Time    Potassium 3.7 02/03/2016 06:30 AM     Lab Results   Component Value Date/Time    Hemoglobin A1c 6.5 (H) 02/03/2016 06:30 AM     Lab Results   Component Value Date/Time    Hemoglobin (POC) 13.9 12/20/2014 10:20 AM    HGB 12.2 02/03/2016 06:30 AM     Lab Results   Component Value Date/Time    PLATELET 461 68/38/8954 06:30 AM     No results for input(s): CPK, CKMB, TROIQ in the last 72 hours. No lab exists for component: CKQMB, CPKMB             ___________________________________________________    Gene Delgado.  Zainab Orourke MD, [facility] [place]

## 2021-04-29 NOTE — PROGRESS NOTES
Chief Complaint   Patient presents with    Follow-up    Hypertension    Cholesterol Problem    Slow Heart Rate     Bradycardia     Visit Vitals  BP (!) 156/74 (BP 1 Location: Left arm, BP Patient Position: Sitting, BP Cuff Size: Large adult)   Pulse 68   Resp 16   Ht 5' (1.524 m)   Wt 177 lb (80.3 kg)   SpO2 98%   BMI 34.57 kg/m²     Patient presents in office with c/o occassional \"burning or stinging\" chest pain started a while ago. She is not doing anything in particular when this happens, but she does feel like she is normally active when she feels it. She states her BP has been up lately. She is unsure if she is taking lisinopril-HCTZ or Losartan-HCTZ. Called pharmacy. Pharmacy states she is on Lisinopril-HCTZ 20-25mg daily. No recent hospital visits. No refills needed at this time.

## 2021-05-03 ENCOUNTER — TELEPHONE (OUTPATIENT)
Dept: CARDIOLOGY CLINIC | Age: 71
End: 2021-05-03

## 2021-05-03 RX ORDER — LISINOPRIL 10 MG/1
10 TABLET ORAL DAILY
Qty: 90 TAB | Refills: 3 | Status: SHIPPED | OUTPATIENT
Start: 2021-05-03 | End: 2022-05-19

## 2021-05-03 RX ORDER — HYDROCHLOROTHIAZIDE 25 MG/1
25 TABLET ORAL DAILY
Qty: 90 TAB | Refills: 3 | Status: SHIPPED | OUTPATIENT
Start: 2021-05-03

## 2021-05-03 NOTE — TELEPHONE ENCOUNTER
Returned pt call, ID [id number]. Pt had  called inquiring because her B/P has been up all weekend with AM readings of:  172/83  154/87  166/84   Pt states that she takes her Toprol in the morning and these results were about an hour after she took her pill. I verified her medication, pt states she is not taking losartan-HCTZ, or lisinopril- HCTZ. Pt also states she is not drinking as much water as she used to when I inquired. I will consult Dr Maximus Bill and get back to her. Pt expressed understanding and agreement. Pt has no further questions or concerns at this time. I consulted Dr Maximus Bill and reported the pt stated she was not taking the for mentioned medications. Per VO pt is to take lisinopril 10 mg daily and HCTZ 25 mg daily. Per VO of Dr. Laura Davalos: 4/29/2021    Future Appointments   Date Time Provider Hany Shaila   5/19/2021 10:00 AM HENNY UMANZOR AMB   3/4/2022  1:00 PM Surya Diaz MD [facility] AMB       Requested Prescriptions     Pending Prescriptions Disp Refills    lisinopriL (PRINIVIL, ZESTRIL) 10 mg tablet 90 Tab 3     Sig: Take 1 Tab by mouth daily.  hydroCHLOROthiazide (HYDRODIURIL) 25 mg tablet 90 Tab 3     Sig: Take 1 Tab by mouth daily. I called the pt back and informed her of Dr Prudence Dunlap instructions. I also encouraged her to stay hydrated since she will be taking a dieretic. Pt expressed understanding and agreement. Pt has no further questions or concerns at this time.

## 2021-05-03 NOTE — TELEPHONE ENCOUNTER
Patient is requesting to speak with a nurse regarding her elevated bp. Patient states that is was 166/84 this morning.  Please advise,    Phone: [phone]

## 2021-05-14 ENCOUNTER — TELEPHONE (OUTPATIENT)
Dept: CARDIOLOGY CLINIC | Age: 71
End: 2021-05-14

## 2021-05-14 NOTE — TELEPHONE ENCOUNTER
Returned pt call, ID [id number]. Pt had  called inquiring because she thought she would have to walk on the treadmill. I informed her that she would not, she's having a Lexican stress, and would get an IV infused medication to speed her heart up. Pt inquired if she still had to abide by the other instructions, and a advised her that YES she does. Pt expressed understanding and agreement. Pt has no further questions or concerns at this time.   I verified her appointment date and time as well

## 2021-05-14 NOTE — TELEPHONE ENCOUNTER
Patient is requesting clarification of the stress test that she is scheduled for 05/19/2021      PHONE:[phone]

## 2021-05-18 ENCOUNTER — TELEPHONE (OUTPATIENT)
Dept: CARDIOLOGY CLINIC | Age: 71
End: 2021-05-18

## 2021-05-19 ENCOUNTER — ANCILLARY PROCEDURE (OUTPATIENT)
Dept: CARDIOLOGY CLINIC | Age: 71
End: 2021-05-19
Payer: MEDICARE

## 2021-05-19 VITALS — WEIGHT: 177 LBS | BODY MASS INDEX: 34.75 KG/M2 | HEIGHT: 60 IN

## 2021-05-19 DIAGNOSIS — R07.9 CHEST PAIN, UNSPECIFIED TYPE: ICD-10-CM

## 2021-05-19 PROCEDURE — A9500 TC99M SESTAMIBI: HCPCS | Performed by: INTERNAL MEDICINE

## 2021-05-19 PROCEDURE — 93015 CV STRESS TEST SUPVJ I&R: CPT | Performed by: INTERNAL MEDICINE

## 2021-05-19 PROCEDURE — 78452 HT MUSCLE IMAGE SPECT MULT: CPT | Performed by: INTERNAL MEDICINE

## 2021-05-19 RX ORDER — TETRAKIS(2-METHOXYISOBUTYLISOCYANIDE)COPPER(I) TETRAFLUOROBORATE 1 MG/ML
24.9 INJECTION, POWDER, LYOPHILIZED, FOR SOLUTION INTRAVENOUS ONCE
Status: COMPLETED | OUTPATIENT
Start: 2021-05-19 | End: 2021-05-19

## 2021-05-19 RX ORDER — TETRAKIS(2-METHOXYISOBUTYLISOCYANIDE)COPPER(I) TETRAFLUOROBORATE 1 MG/ML
8.8 INJECTION, POWDER, LYOPHILIZED, FOR SOLUTION INTRAVENOUS ONCE
Status: COMPLETED | OUTPATIENT
Start: 2021-05-19 | End: 2021-05-19

## 2021-05-19 RX ADMIN — TETRAKIS(2-METHOXYISOBUTYLISOCYANIDE)COPPER(I) TETRAFLUOROBORATE 24.9 MILLICURIE: 1 INJECTION, POWDER, LYOPHILIZED, FOR SOLUTION INTRAVENOUS at 11:40

## 2021-05-19 RX ADMIN — TETRAKIS(2-METHOXYISOBUTYLISOCYANIDE)COPPER(I) TETRAFLUOROBORATE 8.8 MILLICURIE: 1 INJECTION, POWDER, LYOPHILIZED, FOR SOLUTION INTRAVENOUS at 10:10

## 2021-05-20 LAB
STRESS BASELINE DIAS BP: 68 MMHG
STRESS BASELINE HR: 55 BPM
STRESS BASELINE SYS BP: 118 MMHG
STRESS O2 SAT PEAK: 99 %
STRESS O2 SAT REST: 99 %
STRESS PEAK DIAS BP: 88 MMHG
STRESS PEAK SYS BP: 126 MMHG
STRESS PERCENT HR ACHIEVED: 81 %
STRESS POST PEAK HR: 120 BPM
STRESS RATE PRESSURE PRODUCT: NORMAL BPM*MMHG
STRESS ST DEPRESSION: 0 MM
STRESS ST ELEVATION: 0 MM
STRESS TARGET HR: 148 BPM

## 2021-06-01 ENCOUNTER — TELEPHONE (OUTPATIENT)
Dept: CARDIOLOGY CLINIC | Age: 71
End: 2021-06-01

## 2021-06-01 NOTE — TELEPHONE ENCOUNTER
Patient is calling to follow up on her recent Nuclear stress test results. Please advise.     Phone: [phone]

## 2021-08-03 ENCOUNTER — TRANSCRIBE ORDER (OUTPATIENT)
Dept: REGISTRATION | Age: 71
End: 2021-08-03

## 2021-08-03 ENCOUNTER — HOSPITAL ENCOUNTER (OUTPATIENT)
Dept: GENERAL RADIOLOGY | Age: 71
Discharge: HOME | End: 2021-08-03
Payer: MEDICARE

## 2021-08-03 DIAGNOSIS — M17.9 OSTEOARTHRITIS OF KNEE, UNSPECIFIED: Primary | ICD-10-CM

## 2021-08-03 DIAGNOSIS — M17.9 OSTEOARTHRITIS OF KNEE, UNSPECIFIED: ICD-10-CM

## 2021-08-03 PROCEDURE — 73562 X-RAY EXAM OF KNEE 3: CPT

## 2022-03-18 ENCOUNTER — OFFICE VISIT (OUTPATIENT)
Dept: CARDIOLOGY CLINIC | Age: 72
End: 2022-03-18
Payer: MEDICARE

## 2022-03-18 VITALS
HEART RATE: 64 BPM | BODY MASS INDEX: 33.18 KG/M2 | HEIGHT: 60 IN | SYSTOLIC BLOOD PRESSURE: 120 MMHG | WEIGHT: 169 LBS | DIASTOLIC BLOOD PRESSURE: 62 MMHG

## 2022-03-18 DIAGNOSIS — R07.9 CHEST PAIN, UNSPECIFIED TYPE: Primary | ICD-10-CM

## 2022-03-18 DIAGNOSIS — I10 ESSENTIAL HYPERTENSION: ICD-10-CM

## 2022-03-18 DIAGNOSIS — R00.1 BRADYCARDIA: ICD-10-CM

## 2022-03-18 PROCEDURE — G8754 DIAS BP LESS 90: HCPCS | Performed by: INTERNAL MEDICINE

## 2022-03-18 PROCEDURE — G8432 DEP SCR NOT DOC, RNG: HCPCS | Performed by: INTERNAL MEDICINE

## 2022-03-18 PROCEDURE — G8752 SYS BP LESS 140: HCPCS | Performed by: INTERNAL MEDICINE

## 2022-03-18 PROCEDURE — 99214 OFFICE O/P EST MOD 30 MIN: CPT | Performed by: INTERNAL MEDICINE

## 2022-03-18 PROCEDURE — 1101F PT FALLS ASSESS-DOCD LE1/YR: CPT | Performed by: INTERNAL MEDICINE

## 2022-03-18 PROCEDURE — G8428 CUR MEDS NOT DOCUMENT: HCPCS | Performed by: INTERNAL MEDICINE

## 2022-03-18 PROCEDURE — 1090F PRES/ABSN URINE INCON ASSESS: CPT | Performed by: INTERNAL MEDICINE

## 2022-03-18 PROCEDURE — G8417 CALC BMI ABV UP PARAM F/U: HCPCS | Performed by: INTERNAL MEDICINE

## 2022-03-18 PROCEDURE — G8400 PT W/DXA NO RESULTS DOC: HCPCS | Performed by: INTERNAL MEDICINE

## 2022-03-18 PROCEDURE — 3017F COLORECTAL CA SCREEN DOC REV: CPT | Performed by: INTERNAL MEDICINE

## 2022-03-18 PROCEDURE — G8536 NO DOC ELDER MAL SCRN: HCPCS | Performed by: INTERNAL MEDICINE

## 2022-03-18 RX ORDER — LANSOPRAZOLE 30 MG/1
CAPSULE, DELAYED RELEASE ORAL
COMMUNITY

## 2022-03-18 RX ORDER — DICLOFENAC SODIUM 75 MG/1
TABLET, DELAYED RELEASE ORAL AS NEEDED
COMMUNITY

## 2022-03-18 RX ORDER — FAMOTIDINE 20 MG/1
20 TABLET, FILM COATED ORAL
COMMUNITY

## 2022-03-18 NOTE — PROGRESS NOTES
Office Follow-up    NAME: Aram Toure   :  1950  MRM:  [id number]    Date:  3/18/2022            Assessment:     Problem List  Date Reviewed: 2020          Codes Class Noted    Severe obesity ([facility].) ICD-10-CM: E66.01  ICD-9-CM: 278.01  2019        Hyperlipemia ICD-10-CM: E78.5  ICD-9-CM: 272.4  Unknown        Arthritis ICD-10-CM: M19.90  ICD-9-CM: 716.90  Unknown        Chest pain ICD-10-CM: R07.9  ICD-9-CM: 786.50  2016        Hypertension (Chronic) ICD-10-CM: I10  ICD-9-CM: 401.9  2016        DM type 2 (diabetes mellitus, type 2) ([facility].) (Chronic) ICD-10-CM: E11.9  ICD-9-CM: 250.00  2016                 Plan:     1. Chest discomfort: In past she has had a cardiac catheterization in  which only demonstrated mild luminal irregularities. Stress Nuc in May 2021 was normal.    2.  Hypertension: Blood pressure is mostly controlled. Continue Lisinopril, Metoprolol, HCTZ and Amlodipine. 3.  Bradycardia: This is improved since reducing the dosage of metoprolol to 25 mg p.o. daily. 4.  Dyslipidemia: Continue statins. 5.  See Dr. Elio David in 1 year. ATTENTION:   This medical record was transcribed using an electronic medical records/speech recognition system. Although proofread, it may and can contain electronic, spelling and other errors. Corrections may be executed at a later time. Please feel free to contact us for any clarifications as needed. Subjective:     Aram Toure, a 66y.o. year-old who presents for followup. She has prior history of hypertension, dyslipidemia, bradycardia, PVCs, and chest pain. I had recently seen her about a month ago for follow-up at that time she was doing fine however in past few days she has started experiencing some chest discomfort which she describes as pins-and-needles or aching sensation sometimes in the anterior left chest wall especially when she is exerting herself.   She also has noticed the symptoms when she is in stress. No associated symptoms of shortness of breath, lightheadedness or dizziness. EKG from 3/4/2021 demonstrates sinus bradycardia with heart rate of 55 bpm with occasional PVC. Exam:     Physical Exam:  Visit Vitals  /62   Pulse 64   Ht 5' (1.524 m)   Wt 169 lb (76.7 kg)   BMI 33.01 kg/m²     General appearance - alert, well appearing, and in no distress  Mental status - affect appropriate to mood  Eyes - sclera anicteric, moist mucous membranes  Neck - supple, no significant adenopathy  Chest - clear to auscultation, no wheezes, rales or rhonchi  Heart - normal rate, regular rhythm, normal S1, S2, no murmurs, rubs, clicks or gallops  Abdomen - soft, nontender, nondistended, no masses or organomegaly  Extremities - peripheral pulses normal, no pedal edema  Skin - normal coloration  no rashes    Medications:     Current Outpatient Medications   Medication Sig    elderberry fruit (ELDERBERRY PO) Take  by mouth.  B.animalis,bifid,infantis,long (PROBIOTIC 4X PO) Take  by mouth.  diclofenac EC (VOLTAREN) 75 mg EC tablet Take  by mouth two (2) times a day.  lansoprazole (PREVACID) 30 mg capsule Take  by mouth Daily (before breakfast).  famotidine (PEPCID) 20 mg tablet Take 20 mg by mouth nightly.  cholecalciferol, vitamin D3, (Vitamin D3) 50 mcg (2,000 unit) tab Take  by mouth every seven (7) days.  lisinopriL (PRINIVIL, ZESTRIL) 10 mg tablet Take 1 Tab by mouth daily.  hydroCHLOROthiazide (HYDRODIURIL) 25 mg tablet Take 1 Tab by mouth daily.  hydrOXYzine HCL (ATARAX) 25 mg tablet Take  by mouth three (3) times daily as needed.  gabapentin (NEURONTIN) 300 mg capsule Take 300 mg by mouth as needed.  fluocinoNIDE (LIDEX) 0.05 % topical cream Apply  to affected area two (2) times a day.  amLODIPine (NORVASC) 5 mg tablet TAKE 1 TABLET BY MOUTH ONCE DAILY    metoprolol succinate (TOPROL-XL) 25 mg XL tablet Take 1 Tab by mouth nightly.     aspirin delayed-release 81 mg tablet Take  by mouth daily.  latanoprost (XALATAN) 0.005 % ophthalmic solution Administer 1 Drop to both eyes daily.  ipratropium (ATROVENT) 0.06 % nasal spray 2 Sprays by Both Nostrils route four (4) times daily as needed.  simvastatin (ZOCOR) 20 mg tablet Take 20 mg by mouth nightly.  metFORMIN (GLUCOPHAGE) 500 mg tablet Take 1,000 mg by mouth two (2) times daily (with meals). No current facility-administered medications for this visit. Diagnostic Data Review:       [facility]: 2/3/16- L Main: Med; Long; NML. LAD: Med prox/ Small distal ; MLI; D1 nml. LCflex: Med; MLI; OM1/OM2 - MLI. RCA: Small to Med; MLI; PDA and PLB - small. LVEDP: 14      Lab Review:     Lab Results   Component Value Date/Time    Cholesterol, total 154 02/03/2016 06:30 AM    HDL Cholesterol 45 02/03/2016 06:30 AM    LDL, calculated 81.8 02/03/2016 06:30 AM    Triglyceride 136 02/03/2016 06:30 AM    CHOL/HDL Ratio 3.4 02/03/2016 06:30 AM     Lab Results   Component Value Date/Time    Creatinine (POC) 1.0 12/20/2014 10:20 AM    Creatinine 0.91 02/03/2016 06:30 AM     Lab Results   Component Value Date/Time    BUN 18 02/03/2016 06:30 AM    BUN (POC) 17 12/20/2014 10:20 AM     Lab Results   Component Value Date/Time    Potassium 3.7 02/03/2016 06:30 AM     Lab Results   Component Value Date/Time    Hemoglobin A1c 6.5 (H) 02/03/2016 06:30 AM     Lab Results   Component Value Date/Time    Hemoglobin (POC) 13.9 12/20/2014 10:20 AM    HGB 12.2 02/03/2016 06:30 AM     Lab Results   Component Value Date/Time    PLATELET 300 93/02/1362 06:30 AM     No results for input(s): CPK, CKMB, TROIQ in the last 72 hours. No lab exists for component: CKQMB, CPKMB             ___________________________________________________    Bigg Matthews.  Ronnell Client, MD, [facility] [place]

## 2022-03-19 PROBLEM — E66.01 SEVERE OBESITY (HCC): Status: ACTIVE | Noted: 2019-02-21

## 2022-05-19 RX ORDER — LISINOPRIL 10 MG/1
TABLET ORAL
Qty: 90 TABLET | Refills: 3 | Status: SHIPPED | OUTPATIENT
Start: 2022-05-19

## 2022-05-19 NOTE — TELEPHONE ENCOUNTER
Refill per VO of Dr. Ki Azar:    Last appt: 3/18/2022    Future Appointments   Date Time Provider Hany Shaila   3/24/2023 11:40 AM Erin Diaz MD [facility] AMB       Requested Prescriptions     Pending Prescriptions Disp Refills    lisinopriL (PRINIVIL, ZESTRIL) 10 mg tablet [Pharmacy Med Name: LISINOPRIL 10MG TABLETS] 90 Tablet 3     Sig: TAKE 1 TABLET BY MOUTH DAILY

## 2022-10-18 ENCOUNTER — HOSPITAL ENCOUNTER (OUTPATIENT)
Age: 72
Setting detail: OUTPATIENT SURGERY
Discharge: HOME | End: 2022-10-18
Attending: SPECIALIST | Admitting: SPECIALIST
Payer: MEDICARE

## 2022-10-18 ENCOUNTER — ANESTHESIA (OUTPATIENT)
Dept: ENDOSCOPY | Age: 72
End: 2022-10-18
Payer: MEDICARE

## 2022-10-18 ENCOUNTER — ANESTHESIA EVENT (OUTPATIENT)
Dept: ENDOSCOPY | Age: 72
End: 2022-10-18
Payer: MEDICARE

## 2022-10-18 VITALS
RESPIRATION RATE: 18 BRPM | HEIGHT: 60 IN | WEIGHT: 158.29 LBS | DIASTOLIC BLOOD PRESSURE: 64 MMHG | HEART RATE: 68 BPM | TEMPERATURE: 97.9 F | SYSTOLIC BLOOD PRESSURE: 128 MMHG | OXYGEN SATURATION: 98 % | BODY MASS INDEX: 31.08 KG/M2

## 2022-10-18 LAB
GLUCOSE BLD STRIP.AUTO-MCNC: 95 MG/DL (ref 65–117)
SERVICE CMNT-IMP: NORMAL

## 2022-10-18 PROCEDURE — 82962 GLUCOSE BLOOD TEST: CPT

## 2022-10-18 PROCEDURE — 76060000031 HC ANESTHESIA FIRST 0.5 HR: Performed by: SPECIALIST

## 2022-10-18 PROCEDURE — 74011250636 HC RX REV CODE- 250/636: Performed by: NURSE ANESTHETIST, CERTIFIED REGISTERED

## 2022-10-18 PROCEDURE — 2709999900 HC NON-CHARGEABLE SUPPLY: Performed by: SPECIALIST

## 2022-10-18 PROCEDURE — 76040000019: Performed by: SPECIALIST

## 2022-10-18 RX ORDER — FLUMAZENIL 0.1 MG/ML
0.2 INJECTION INTRAVENOUS
Status: DISCONTINUED | OUTPATIENT
Start: 2022-10-18 | End: 2022-10-18 | Stop reason: HOSPADM

## 2022-10-18 RX ORDER — FENTANYL CITRATE 50 UG/ML
25 INJECTION, SOLUTION INTRAMUSCULAR; INTRAVENOUS AS NEEDED
Status: DISCONTINUED | OUTPATIENT
Start: 2022-10-18 | End: 2022-10-18 | Stop reason: HOSPADM

## 2022-10-18 RX ORDER — SODIUM CHLORIDE 9 MG/ML
50 INJECTION, SOLUTION INTRAVENOUS CONTINUOUS
Status: DISCONTINUED | OUTPATIENT
Start: 2022-10-18 | End: 2022-10-18 | Stop reason: HOSPADM

## 2022-10-18 RX ORDER — PROPOFOL 10 MG/ML
INJECTION, EMULSION INTRAVENOUS AS NEEDED
Status: DISCONTINUED | OUTPATIENT
Start: 2022-10-18 | End: 2022-10-18 | Stop reason: HOSPADM

## 2022-10-18 RX ORDER — DEXTROMETHORPHAN/PSEUDOEPHED 2.5-7.5/.8
1.2 DROPS ORAL
Status: DISCONTINUED | OUTPATIENT
Start: 2022-10-18 | End: 2022-10-18 | Stop reason: HOSPADM

## 2022-10-18 RX ORDER — MIDAZOLAM HYDROCHLORIDE 1 MG/ML
.25-5 INJECTION, SOLUTION INTRAMUSCULAR; INTRAVENOUS AS NEEDED
Status: DISCONTINUED | OUTPATIENT
Start: 2022-10-18 | End: 2022-10-18 | Stop reason: HOSPADM

## 2022-10-18 RX ORDER — PROPOFOL 10 MG/ML
INJECTION, EMULSION INTRAVENOUS
Status: DISCONTINUED | OUTPATIENT
Start: 2022-10-18 | End: 2022-10-18 | Stop reason: HOSPADM

## 2022-10-18 RX ORDER — SODIUM CHLORIDE 9 MG/ML
INJECTION, SOLUTION INTRAVENOUS
Status: DISCONTINUED | OUTPATIENT
Start: 2022-10-18 | End: 2022-10-18 | Stop reason: HOSPADM

## 2022-10-18 RX ORDER — NALOXONE HYDROCHLORIDE 0.4 MG/ML
0.4 INJECTION, SOLUTION INTRAMUSCULAR; INTRAVENOUS; SUBCUTANEOUS
Status: DISCONTINUED | OUTPATIENT
Start: 2022-10-18 | End: 2022-10-18 | Stop reason: HOSPADM

## 2022-10-18 RX ADMIN — PROPOFOL 40 MG: 10 INJECTION, EMULSION INTRAVENOUS at 10:13

## 2022-10-18 RX ADMIN — PROPOFOL 80 MG: 10 INJECTION, EMULSION INTRAVENOUS at 10:10

## 2022-10-18 RX ADMIN — PROPOFOL 100 MCG/KG/MIN: 10 INJECTION, EMULSION INTRAVENOUS at 10:10

## 2022-10-18 RX ADMIN — SODIUM CHLORIDE: 9 INJECTION, SOLUTION INTRAVENOUS at 09:45

## 2022-10-18 RX ADMIN — PROPOFOL 20 MG: 10 INJECTION, EMULSION INTRAVENOUS at 10:11

## 2022-10-18 NOTE — ANESTHESIA POSTPROCEDURE EVALUATION
Procedure(s):  COLONOSCOPY. MAC    Anesthesia Post Evaluation      Multimodal analgesia: multimodal analgesia used between 6 hours prior to anesthesia start to PACU discharge  Patient location during evaluation: bedside  Patient participation: complete - patient participated  Level of consciousness: awake  Pain management: adequate  Airway patency: patent  Anesthetic complications: no  Cardiovascular status: acceptable  Respiratory status: acceptable  Hydration status: acceptable        INITIAL Post-op Vital signs:   Vitals Value Taken Time   /64 10/18/22 1046   Temp 36.6 °C (97.9 °F) 10/18/22 1035   Pulse 64 10/18/22 1047   Resp 17 10/18/22 1047   SpO2 99 % 10/18/22 1047   Vitals shown include unvalidated device data.

## 2022-10-18 NOTE — DISCHARGE INSTRUCTIONS
[address] [facility], MD  [phone]      October 18, 2022    Taqueria Pineda  YOB: 1950    COLONOSCOPY DISCHARGE INSTRUCTIONS    If there is redness at IV site you should apply warm compress to area. If redness or soreness persist contact Dr. [facility] or your primary care doctor. There may be a slight amount of blood passed from the rectum. Gaseous discomfort may develop, but walking, belching will help relieve this. You may not operate a vehicle for 12 hours  You may not operate machinery or dangerous appliances for rest of today  You may not drink alcoholic beverages for 12 hours  Avoid making any critical decisions for 24 hours    DIET:  You may resume your normal diet, but some patients find that heavy or large meals may lead to indigestion or vomiting. I suggest a light meal as first food intake. MEDICATIONS:  The use of some over-the-counter pain medication may lead to bleeding after colon biopsies or polyp removal.  Tylenol (also called acetaminophen) is safe to take even if you have had colonoscopy with polyp removal.  Based on the procedure you had today you may safely take aspirin or aspirin-like products for the next ten (10) days. Remember that Tylenol (also called acetaminophen) is safe to take after colonoscopy even if you have had biopsies or polyps removed. ACTIVITY:  You may resume your normal household activities, but it is recommended that you spend the remainder of the day resting -  avoid any strenuous activity. CALL DR. Glenn Kim' OFFICE IF:  Increasing pain, nausea, vomiting  Abdominal distension (swelling)  Significant new or increased bleeding (oral or rectal)  Fever/Chills  Chest pain/shortness of breath                       Additional instructions:   Great news, no colon cancer and no colon polyps.   You would only have to do colonoscopy again in the future if you were to develop symptoms suggestive of a colon problem such as bleeding or anemia. It was an honor to be your doctor today. Please let me or my office staff know if you have any feedback about today's procedure. Alize Lombardi MD    Colonoscopy saves lives, and can prevent colon cancer. Everyone aged 48 or older needs colonoscopy.   Tell your family and friends: get the test!

## 2022-10-18 NOTE — PROCEDURES
[address] [facility] MU Patel MD  [phone]      2022    Colonoscopy Procedure Note  Siddharth Knight  :  1950  [place] Medical Record Number: [id number]    Indications:     Screening colonoscopy  PCP:  Janina Alaniz MD  Anesthesia/Sedation: Conscious Sedation/Moderate Sedation/GETA, see notes  Endoscopist:  Dr. Yoseph Zavala  Complications:  None  Estimated Blood Loss:  None    Permit:  The indications, risks, benefits and alternatives were reviewed with the patient or their decision maker who was provided an opportunity to ask questions and all questions were answered. The specific risks of colonoscopy with conscious sedation were reviewed, including but not limited to anesthetic complication, bleeding, adverse drug reaction, missed lesion, infection, IV site reactions, and intestinal perforation which would lead to the need for surgical repair. Alternatives to colonoscopy including radiographic imaging, observation without testing, or laboratory testing were reviewed including the limitations of those alternatives. After considering the options and having all their questions answered, the patient or their decision maker provided both verbal and written consent to proceed. Procedure in Detail:  After obtaining informed consent, positioning of the patient in the left lateral decubitus position, and conduction of a pre-procedure pause or \"time out\" the endoscope was introduced into the anus and advanced to the cecum, which was identified by the ileocecal valve and appendiceal orifice. The quality of the colonic preparation was good. A careful inspection was made as the colonoscope was withdrawn, findings and interventions are described below. Findings: There is diverticulosis in the sigmoid colon without complications such as bleeding, inflammatory change, or luminal narrowing.   In the rectum, medium internal hemorrhoids are noted without bleeding. Specimens:    none    Complications:   None; patient tolerated the procedure well. Impression:  Otherwise normal colonoscopy to the cecum    Recommendations:     - Follow up with primary care physician. - Future colonoscopy can be reserved for diagnostic purposes. Further colon cancer screening, given her age and lack of neoplastic findings today, is not indicated. Thank you for entrusting me with this patient's care. Please do not hesitate to contact me with any questions or if I can be of assistance with any of your other patients' GI needs. Signed By: Debbie Coates MD                        October 18, 2022      Surgical assistant none. Implants none unless specified.

## 2022-10-18 NOTE — ANESTHESIA PREPROCEDURE EVALUATION
Relevant Problems   CARDIOVASCULAR   (+) Hypertension      ENDOCRINE   (+) Arthritis   (+) DM type 2 (diabetes mellitus, type 2) (HCC)   (+) Severe obesity (HCC)       Anesthetic History   No history of anesthetic complications            Review of Systems / Medical History  Patient summary reviewed and pertinent labs reviewed    Pulmonary  Within defined limits                 Neuro/Psych              Cardiovascular    Hypertension: well controlled          Hyperlipidemia    Exercise tolerance: >4 METS     GI/Hepatic/Renal     GERD: well controlled           Endo/Other    Diabetes         Other Findings              Physical Exam    Airway  Mallampati: II  TM Distance: > 6 cm  Neck ROM: normal range of motion   Mouth opening: Normal     Cardiovascular    Rhythm: regular  Rate: normal         Dental    Dentition: Lower dentition intact     Pulmonary  Breath sounds clear to auscultation               Abdominal         Other Findings            Anesthetic Plan    ASA: 3  Anesthesia type: MAC          Induction: Intravenous  Anesthetic plan and risks discussed with: Patient

## 2022-10-18 NOTE — PROGRESS NOTES
David Damian  1950  [id number]    Situation:  Verbal report received from:   Zuly Prado RN   Procedure: Procedure(s):  COLONOSCOPY    Background:    Preoperative diagnosis: SCREENING  Postoperative diagnosis: Diverticulosis. Hemorrhoids. :  Dr. Mavis Santizo   Assistant(s): Endoscopy Technician-1: Linda Hart  Endoscopy RN-1: Melissa Montemayor RN    Specimens: * No specimens in log *  H. Pylori  no    Assessment:  Intra-procedure medications     Anesthesia gave intra-procedure sedation and medications, see anesthesia flow sheet yes    Intravenous fluids: NS@ KVO     Vital signs stable   yes    Abdominal assessment: round and soft   yes    Recommendation:  Discharge patient per MD order  yes.   Return to floor  outpatient  Family or Friend   family   Permission to share finding with family or friend yes

## 2022-10-18 NOTE — PERIOP NOTES
1009  Timeout performed. Anesthesia staff at patient's bedside administering anesthesia and monitoring patients vital signs throughout procedure. See anesthesia note. Post procedure, report received from Rogers LOOMIS. 1027  Endoscope was pre-cleaned at bedside immediately following procedure by endo Kristen oleary, [address] [place] [address]  Patient tolerated procedure. Abdomen soft and patient arousable and voices no complaints. Patient transported to endoscopy recovery area. Report given to post procedure RNAJIT.

## 2022-10-18 NOTE — PROGRESS NOTES
Endoscopy discharge instructions have been reviewed and given to patient. The patient verbalized understanding and acceptance of instructions. Dr. Ani Zamorano  discussed with   procedure findings and next steps.

## 2022-10-18 NOTE — H&P
67 y.o. female for open access colonoscopy for screening   Additional data for completion of the targeted pre-endoscopy H&P will be provided under 'H&P interval notes'. Please see that document which will be attached to this.   Caroline May MD  Last colonoscopy Kassandra Carnes 2012 negative

## 2022-10-18 NOTE — INTERVAL H&P NOTE
Pre-Endoscopy H&P Update  Chief complaint/HPI/ROS:  The indication for the procedure, the patient's history and the patient's current medications are reviewed prior to the procedure and that data is reported on the H&P to which this document is attached. Any significant complaints with regard to organ systems will be noted, and if not mentioned then a review of systems is not contributory. Past Medical History:   Diagnosis Date    Arthritis     Diabetes ([address]     GERD (gastroesophageal reflux disease)     Hyperlipemia     Hypertension       Past Surgical History:   Procedure Laterality Date    HX CARPAL TUNNEL RELEASE      HX HEENT      tonsilectomy    HX HYSTERECTOMY      HX KNEE ARTHROSCOPY       Social   Social History     Tobacco Use    Smoking status: Former     Types: Cigarettes     Quit date: 1996     Years since quittin.6    Smokeless tobacco: Never   Substance Use Topics    Alcohol use: No      Family History   Problem Relation Age of Onset    Heart Disease Father     Cancer Father         oral    Cancer Sister         breast    Hypertension Other     Diabetes Other       Allergies   Allergen Reactions    Clindamycin Itching    Codeine Itching      Prior to Admission Medications   Prescriptions Last Dose Informant Patient Reported? Taking? amLODIPine (NORVASC) 5 mg tablet 10/17/2022  No Yes   Sig: TAKE 1 TABLET BY MOUTH ONCE DAILY   aspirin delayed-release 81 mg tablet 10/14/2022  Yes No   Sig: Take  by mouth daily. cholecalciferol, vitamin D3, (VITAMIN D3) 1,250 mcg (50,000 unit) tablet 10/11/2022  Yes Yes   Sig: Take  by mouth every seven (7) days. diclofenac EC (VOLTAREN) 75 mg EC tablet Unknown  Yes No   Sig: Take  by mouth as needed. elderberry fruit (ELDERBERRY PO) 10/17/2022  Yes Yes   Sig: Take  by mouth. famotidine (PEPCID) 20 mg tablet 10/17/2022  Yes Yes   Sig: Take 20 mg by mouth nightly.    fluocinoNIDE (LIDEX) 0.05 % topical cream   Yes No   Sig: Apply  to affected area two (2) times a day.   gabapentin (NEURONTIN) 300 mg capsule Unknown  Yes No   Sig: Take 300 mg by mouth as needed. hydrOXYzine HCL (ATARAX) 25 mg tablet   Yes No   Sig: Take  by mouth three (3) times daily as needed. Indications: itching   hydroCHLOROthiazide (HYDRODIURIL) 25 mg tablet 10/17/2022  No Yes   Sig: Take 1 Tab by mouth daily. ipratropium (ATROVENT) 0.06 % nasal spray Unknown Self Yes No   Si Sprays by Both Nostrils route four (4) times daily as needed. lansoprazole (PREVACID) 30 mg capsule 10/17/2022  Yes Yes   Sig: Take  by mouth Daily (before breakfast). latanoprost (XALATAN) 0.005 % ophthalmic solution Not Taking Self Yes No   Sig: Administer 1 Drop to both eyes daily. Patient not taking: Reported on 10/18/2022   lisinopriL (PRINIVIL, ZESTRIL) 10 mg tablet 10/17/2022  No Yes   Sig: TAKE 1 TABLET BY MOUTH DAILY   metFORMIN (GLUCOPHAGE) 500 mg tablet 10/17/2022 Self Yes Yes   Sig: Take 1,000 mg by mouth two (2) times daily (with meals). metoprolol succinate (TOPROL-XL) 25 mg XL tablet 10/17/2022  No Yes   Sig: Take 1 Tab by mouth nightly. simvastatin (ZOCOR) 20 mg tablet 10/17/2022 Self Yes Yes   Sig: Take 20 mg by mouth nightly. Facility-Administered Medications: None       PHYSICAL EXAM:  The patient is examined immediately prior to the procedure. Visit Vitals  BP (!) 138/55   Pulse 69   Temp 98.7 °F (37.1 °C)   Resp 15   Ht 5' (1.524 m)   Wt 71.8 kg (158 lb 4.6 oz)   SpO2 99%   Breastfeeding No   BMI 30.91 kg/m²     Gen: Appears comfortable, no distress. Pulm: comfortable respirations with no abnormal audible breath sounds  HEART: well perfused, no abnormal audible heart sounds  GI: abdomen flat. PLAN:  Informed consent discussion held, patient afforded an opportunity to ask questions and all questions answered. After being advised of the risks, benefits, and alternatives, the patient requested that we proceed and indicated so on a written consent form.       Will proceed with procedure as planned.   William Newell MD

## 2022-10-21 LAB
GLUCOSE BLD STRIP.AUTO-MCNC: NORMAL MG/DL (ref 65–117)
SERVICE CMNT-IMP: NORMAL

## 2023-06-26 ENCOUNTER — HOSPITAL ENCOUNTER (EMERGENCY)
Facility: HOSPITAL | Age: 73
Discharge: HOME | End: 2023-06-26
Attending: EMERGENCY MEDICINE
Payer: MEDICARE

## 2023-06-26 ENCOUNTER — APPOINTMENT (OUTPATIENT)
Facility: HOSPITAL | Age: 73
End: 2023-06-26
Payer: MEDICARE

## 2023-06-26 ENCOUNTER — TELEPHONE (OUTPATIENT)
Age: 73
End: 2023-06-26

## 2023-06-26 VITALS
DIASTOLIC BLOOD PRESSURE: 96 MMHG | HEART RATE: 79 BPM | HEIGHT: 60 IN | WEIGHT: 158 LBS | BODY MASS INDEX: 31.02 KG/M2 | RESPIRATION RATE: 18 BRPM | SYSTOLIC BLOOD PRESSURE: 126 MMHG | OXYGEN SATURATION: 99 % | TEMPERATURE: 98.5 F

## 2023-06-26 DIAGNOSIS — R42 DIZZINESS: Primary | ICD-10-CM

## 2023-06-26 LAB
ALBUMIN SERPL-MCNC: 3.6 G/DL (ref 3.5–5)
ALBUMIN/GLOB SERPL: 0.9 (ref 1.1–2.2)
ALP SERPL-CCNC: 87 U/L (ref 45–117)
ALT SERPL-CCNC: 16 U/L (ref 12–78)
ANION GAP SERPL CALC-SCNC: 3 MMOL/L (ref 5–15)
AST SERPL-CCNC: 12 U/L (ref 15–37)
BASOPHILS # BLD: 0.1 K/UL (ref 0–0.1)
BASOPHILS NFR BLD: 1 % (ref 0–1)
BILIRUB SERPL-MCNC: 0.8 MG/DL (ref 0.2–1)
BUN SERPL-MCNC: 18 MG/DL (ref 6–20)
BUN/CREAT SERPL: 16 (ref 12–20)
CALCIUM SERPL-MCNC: 9.8 MG/DL (ref 8.5–10.1)
CHLORIDE SERPL-SCNC: 106 MMOL/L (ref 97–108)
CO2 SERPL-SCNC: 31 MMOL/L (ref 21–32)
CREAT SERPL-MCNC: 1.14 MG/DL (ref 0.55–1.02)
DIFFERENTIAL METHOD BLD: NORMAL
EOSINOPHIL # BLD: 0.1 K/UL (ref 0–0.4)
EOSINOPHIL NFR BLD: 1 % (ref 0–7)
ERYTHROCYTE [DISTWIDTH] IN BLOOD BY AUTOMATED COUNT: 13.4 % (ref 11.5–14.5)
GLOBULIN SER CALC-MCNC: 4.1 G/DL (ref 2–4)
GLUCOSE SERPL-MCNC: 109 MG/DL (ref 65–100)
HCT VFR BLD AUTO: 39.4 % (ref 35–47)
HGB BLD-MCNC: 12.5 G/DL (ref 11.5–16)
IMM GRANULOCYTES # BLD AUTO: 0 K/UL (ref 0–0.04)
IMM GRANULOCYTES NFR BLD AUTO: 0 % (ref 0–0.5)
LYMPHOCYTES # BLD: 2.7 K/UL (ref 0.8–3.5)
LYMPHOCYTES NFR BLD: 32 % (ref 12–49)
MAGNESIUM SERPL-MCNC: 1.8 MG/DL (ref 1.6–2.4)
MCH RBC QN AUTO: 26.4 PG (ref 26–34)
MCHC RBC AUTO-ENTMCNC: 31.7 G/DL (ref 30–36.5)
MCV RBC AUTO: 83.3 FL (ref 80–99)
MONOCYTES # BLD: 0.6 K/UL (ref 0–1)
MONOCYTES NFR BLD: 7 % (ref 5–13)
NEUTS SEG # BLD: 5.1 K/UL (ref 1.8–8)
NEUTS SEG NFR BLD: 59 % (ref 32–75)
NRBC # BLD: 0 K/UL (ref 0–0.01)
NRBC BLD-RTO: 0 PER 100 WBC
PLATELET # BLD AUTO: 308 K/UL (ref 150–400)
PMV BLD AUTO: 11 FL (ref 8.9–12.9)
POTASSIUM SERPL-SCNC: 4 MMOL/L (ref 3.5–5.1)
PROT SERPL-MCNC: 7.7 G/DL (ref 6.4–8.2)
RBC # BLD AUTO: 4.73 M/UL (ref 3.8–5.2)
SODIUM SERPL-SCNC: 140 MMOL/L (ref 136–145)
TROPONIN I SERPL HS-MCNC: 4 NG/L (ref 0–51)
TROPONIN I SERPL HS-MCNC: 4 NG/L (ref 0–51)
WBC # BLD AUTO: 8.6 K/UL (ref 3.6–11)

## 2023-06-26 PROCEDURE — 96374 THER/PROPH/DIAG INJ IV PUSH: CPT

## 2023-06-26 PROCEDURE — 71046 X-RAY EXAM CHEST 2 VIEWS: CPT

## 2023-06-26 PROCEDURE — 99284 EMERGENCY DEPT VISIT MOD MDM: CPT

## 2023-06-26 PROCEDURE — 6370000000 HC RX 637 (ALT 250 FOR IP): Performed by: EMERGENCY MEDICINE

## 2023-06-26 PROCEDURE — 6360000002 HC RX W HCPCS: Performed by: EMERGENCY MEDICINE

## 2023-06-26 PROCEDURE — 84484 ASSAY OF TROPONIN QUANT: CPT

## 2023-06-26 PROCEDURE — 94761 N-INVAS EAR/PLS OXIMETRY MLT: CPT

## 2023-06-26 PROCEDURE — 85025 COMPLETE CBC W/AUTO DIFF WBC: CPT

## 2023-06-26 PROCEDURE — 80053 COMPREHEN METABOLIC PANEL: CPT

## 2023-06-26 PROCEDURE — 93005 ELECTROCARDIOGRAM TRACING: CPT | Performed by: EMERGENCY MEDICINE

## 2023-06-26 PROCEDURE — 83735 ASSAY OF MAGNESIUM: CPT

## 2023-06-26 PROCEDURE — 36415 COLL VENOUS BLD VENIPUNCTURE: CPT

## 2023-06-26 RX ORDER — ONDANSETRON 2 MG/ML
4 INJECTION INTRAMUSCULAR; INTRAVENOUS
Status: COMPLETED | OUTPATIENT
Start: 2023-06-26 | End: 2023-06-26

## 2023-06-26 RX ORDER — ACETAMINOPHEN 500 MG
1000 TABLET ORAL
Status: COMPLETED | OUTPATIENT
Start: 2023-06-26 | End: 2023-06-26

## 2023-06-26 RX ADMIN — ACETAMINOPHEN 1000 MG: 500 TABLET, FILM COATED ORAL at 20:20

## 2023-06-26 RX ADMIN — ONDANSETRON 4 MG: 2 INJECTION INTRAMUSCULAR; INTRAVENOUS at 15:37

## 2023-06-26 ASSESSMENT — ENCOUNTER SYMPTOMS
TROUBLE SWALLOWING: 0
COLOR CHANGE: 0
NAUSEA: 1
VOMITING: 1
SHORTNESS OF BREATH: 0
CONSTIPATION: 0
DIARRHEA: 0
COUGH: 0
ABDOMINAL PAIN: 0
ORTHOPNEA: 0
HEARTBURN: 0
BACK PAIN: 0

## 2023-06-26 ASSESSMENT — LIFESTYLE VARIABLES
HOW MANY STANDARD DRINKS CONTAINING ALCOHOL DO YOU HAVE ON A TYPICAL DAY: PATIENT DOES NOT DRINK
HOW OFTEN DO YOU HAVE A DRINK CONTAINING ALCOHOL: NEVER

## 2023-06-26 ASSESSMENT — PAIN DESCRIPTION - LOCATION: LOCATION: HEAD

## 2023-06-26 ASSESSMENT — PAIN - FUNCTIONAL ASSESSMENT: PAIN_FUNCTIONAL_ASSESSMENT: NONE - DENIES PAIN

## 2023-06-26 ASSESSMENT — PAIN SCALES - GENERAL: PAINLEVEL_OUTOF10: 8

## 2023-06-26 NOTE — TELEPHONE ENCOUNTER
Returned phone call, spoke withthe pt,  identified the pt with name and . she was calling to  report. .. Saturday at the [facility], she was walking, the more she walked the weaker she got. She stopped and rested a couple times before making it to the car where she threw up. She felt awful the rest of the day. She hasn't had any issues today. I advised her to come in to see NP, I scheduled her with Jes Comer on Wednesday morning, and if  it happens again to either go to the ER or call 911. The pt expressed understanding and agreement. Pt has no further questions or concerns at this time.

## 2023-06-26 NOTE — TELEPHONE ENCOUNTER
Pt called and stated she be having tightens in chest and SOB,stated it comes and goes,please advise    [phone]

## 2023-06-29 LAB
EKG ATRIAL RATE: 62 BPM
EKG DIAGNOSIS: NORMAL
EKG P AXIS: 59 DEGREES
EKG P-R INTERVAL: 174 MS
EKG Q-T INTERVAL: 398 MS
EKG QRS DURATION: 70 MS
EKG QTC CALCULATION (BAZETT): 403 MS
EKG R AXIS: 62 DEGREES
EKG T AXIS: 71 DEGREES
EKG VENTRICULAR RATE: 62 BPM

## 2023-07-31 ENCOUNTER — OFFICE VISIT (OUTPATIENT)
Age: 73
End: 2023-07-31
Payer: MEDICARE

## 2023-07-31 VITALS
OXYGEN SATURATION: 98 % | SYSTOLIC BLOOD PRESSURE: 100 MMHG | HEIGHT: 60 IN | DIASTOLIC BLOOD PRESSURE: 60 MMHG | HEART RATE: 73 BPM | BODY MASS INDEX: 33.77 KG/M2 | WEIGHT: 172 LBS

## 2023-07-31 DIAGNOSIS — R07.89 OTHER CHEST PAIN: Primary | ICD-10-CM

## 2023-07-31 DIAGNOSIS — I10 ESSENTIAL (PRIMARY) HYPERTENSION: ICD-10-CM

## 2023-07-31 DIAGNOSIS — E78.2 MIXED HYPERLIPIDEMIA: ICD-10-CM

## 2023-07-31 DIAGNOSIS — R00.1 BRADYCARDIA, UNSPECIFIED: ICD-10-CM

## 2023-07-31 PROCEDURE — G8417 CALC BMI ABV UP PARAM F/U: HCPCS | Performed by: INTERNAL MEDICINE

## 2023-07-31 PROCEDURE — 1123F ACP DISCUSS/DSCN MKR DOCD: CPT | Performed by: INTERNAL MEDICINE

## 2023-07-31 PROCEDURE — 3078F DIAST BP <80 MM HG: CPT | Performed by: INTERNAL MEDICINE

## 2023-07-31 PROCEDURE — 3074F SYST BP LT 130 MM HG: CPT | Performed by: INTERNAL MEDICINE

## 2023-07-31 PROCEDURE — 93005 ELECTROCARDIOGRAM TRACING: CPT | Performed by: INTERNAL MEDICINE

## 2023-07-31 PROCEDURE — 1036F TOBACCO NON-USER: CPT | Performed by: INTERNAL MEDICINE

## 2023-07-31 PROCEDURE — 1090F PRES/ABSN URINE INCON ASSESS: CPT | Performed by: INTERNAL MEDICINE

## 2023-07-31 PROCEDURE — 99214 OFFICE O/P EST MOD 30 MIN: CPT | Performed by: INTERNAL MEDICINE

## 2023-07-31 PROCEDURE — G8428 CUR MEDS NOT DOCUMENT: HCPCS | Performed by: INTERNAL MEDICINE

## 2023-07-31 PROCEDURE — G8400 PT W/DXA NO RESULTS DOC: HCPCS | Performed by: INTERNAL MEDICINE

## 2023-07-31 PROCEDURE — 93010 ELECTROCARDIOGRAM REPORT: CPT | Performed by: INTERNAL MEDICINE

## 2023-07-31 PROCEDURE — 3017F COLORECTAL CA SCREEN DOC REV: CPT | Performed by: INTERNAL MEDICINE

## 2023-07-31 NOTE — PROGRESS NOTES
Office Follow-up    NAME: Julianne Garcia   :  1950  MRM:  [id number]    Date:  2023            Assessment:     Problem List  Date Reviewed: 2020            Codes Class Noted    Severe obesity ([address]) ICD-10-CM: E66.01  ICD-9-CM: 278.01  2019        Hyperlipemia ICD-10-CM: E78.5  ICD-9-CM: 272.4  Unknown        Arthritis ICD-10-CM: M19.90  ICD-9-CM: 716.90  Unknown        Chest pain ICD-10-CM: R07.9  ICD-9-CM: 786.50  2016        Hypertension (Chronic) ICD-10-CM: I10  ICD-9-CM: 401.9  2016        DM type 2 (diabetes mellitus, type 2) ([address]) (Chronic) ICD-10-CM: E11.9  ICD-9-CM: 250.00  2016              Plan:     1. CAD: Atypical chest pain. Will check stress Lexiscan. Mild on C in . Stress Nuc in May 2021 was normal.    2.  Hypertension: BP controlled. Cont. Lisinopril, HCTZ, Amlodipine, Toprol XL    3. Bradycardia: This is improved since reducing the dosage of metoprolol to 25 mg p.o. daily. 4.  Dyslipidemia: Continue Zocor     5. Obesity: Has lot about 20lb by increasing activites but has gained most of it. 6.  See Dr. Crow Schuster in 1 year. ATTENTION:   This medical record was transcribed using an electronic medical records/speech recognition system. Although proofread, it may and can contain electronic, spelling and other errors. Corrections may be executed at a later time. Please feel free to contact us for any clarifications as needed. Subjective:     No chest pain, SOB, palpitations or dizziness. ----------------  Julianne Garcia, a 75y.o. year-old who presents for followup. She has prior history of hypertension, dyslipidemia, bradycardia, PVCs, and chest pain.   I had recently seen her about a month ago for follow-up at that time she was doing fine however in past few days she has started experiencing some chest discomfort which she describes as pins-and-needles or aching sensation sometimes in the anterior left chest wall especially

## 2023-08-07 ENCOUNTER — TELEPHONE (OUTPATIENT)
Age: 73
End: 2023-08-07

## 2023-08-07 NOTE — TELEPHONE ENCOUNTER
Pt called to mentioned she's been to the dr for sinus infection on 8/5, she hasn't taken it yet and ask if this will affect the test she has this week.         Pt# [phone]

## 2023-08-07 NOTE — TELEPHONE ENCOUNTER
ID verified per protocol. Verified it is ok to take antibiotic prior to stress test. Moved apt to Monday.

## 2023-08-14 ENCOUNTER — ANCILLARY PROCEDURE (OUTPATIENT)
Age: 73
End: 2023-08-14
Payer: MEDICARE

## 2023-08-14 VITALS — WEIGHT: 172 LBS | HEIGHT: 60 IN | BODY MASS INDEX: 33.77 KG/M2

## 2023-08-14 DIAGNOSIS — R07.89 OTHER CHEST PAIN: ICD-10-CM

## 2023-08-14 DIAGNOSIS — I10 ESSENTIAL (PRIMARY) HYPERTENSION: ICD-10-CM

## 2023-08-14 DIAGNOSIS — R00.1 BRADYCARDIA, UNSPECIFIED: ICD-10-CM

## 2023-08-14 PROCEDURE — 78452 HT MUSCLE IMAGE SPECT MULT: CPT

## 2023-08-14 PROCEDURE — A9500 TC99M SESTAMIBI: HCPCS | Performed by: INTERNAL MEDICINE

## 2023-08-14 RX ORDER — TETRAKIS(2-METHOXYISOBUTYLISOCYANIDE)COPPER(I) TETRAFLUOROBORATE 1 MG/ML
25.4 INJECTION, POWDER, LYOPHILIZED, FOR SOLUTION INTRAVENOUS
Status: COMPLETED | OUTPATIENT
Start: 2023-08-14 | End: 2023-08-14

## 2023-08-14 RX ORDER — REGADENOSON 0.08 MG/ML
0.4 INJECTION, SOLUTION INTRAVENOUS
Status: COMPLETED | OUTPATIENT
Start: 2023-08-14 | End: 2023-08-14

## 2023-08-14 RX ORDER — TETRAKIS(2-METHOXYISOBUTYLISOCYANIDE)COPPER(I) TETRAFLUOROBORATE 1 MG/ML
8.1 INJECTION, POWDER, LYOPHILIZED, FOR SOLUTION INTRAVENOUS
Status: COMPLETED | OUTPATIENT
Start: 2023-08-14 | End: 2023-08-14

## 2023-08-14 RX ADMIN — REGADENOSON 0.4 MG: 0.08 INJECTION, SOLUTION INTRAVENOUS at 13:34

## 2023-08-14 RX ADMIN — TECHNETIUM TC-99M SESTAMIBI 25.4 MILLICURIE: 1 INJECTION INTRAVENOUS at 13:31

## 2023-08-14 RX ADMIN — TECHNETIUM TC-99M SESTAMIBI 8.1 MILLICURIE: 1 INJECTION INTRAVENOUS at 12:35

## 2023-08-15 LAB
ECHO BSA: 1.82 M2
NUC STRESS EJECTION FRACTION: 45 %
STRESS BASELINE DIAS BP: 70 MMHG
STRESS BASELINE HR: 51 BPM
STRESS BASELINE SYS BP: 150 MMHG
STRESS ESTIMATED WORKLOAD: 0 METS
STRESS EXERCISE DUR MIN: 0 MIN
STRESS EXERCISE DUR SEC: 0 SEC
STRESS O2 SAT PEAK: 100 %
STRESS O2 SAT REST: 99 %
STRESS PEAK DIAS BP: 74 MMHG
STRESS PEAK SYS BP: 172 MMHG
STRESS PERCENT HR ACHIEVED: 84 %
STRESS POST PEAK HR: 122 BPM
STRESS RATE PRESSURE PRODUCT: NORMAL BPM*MMHG
STRESS ST DEPRESSION: 0 MM
STRESS TARGET HR: 146 BPM
TID: 1.32

## 2023-08-22 ENCOUNTER — TELEPHONE (OUTPATIENT)
Age: 73
End: 2023-08-22

## 2023-08-29 ENCOUNTER — TELEPHONE (OUTPATIENT)
Age: 73
End: 2023-08-29

## 2023-11-07 ENCOUNTER — CLINICAL DOCUMENTATION (OUTPATIENT)
Age: 73
End: 2023-11-07

## 2023-11-07 NOTE — PROGRESS NOTES
Received VO from Dr. Ricky Woodall:  She can proceed.    Hold ASA for 7 days     Risk stratification and Medication hold faxed to [facility] @ [phone], wrist ganglion removal, hold asa 7 days Abdominal pain

## 2024-04-01 RX ORDER — LISINOPRIL 10 MG/1
TABLET ORAL DAILY
Qty: 100 TABLET | Refills: 0 | Status: SHIPPED | OUTPATIENT
Start: 2024-04-01

## 2024-04-01 NOTE — TELEPHONE ENCOUNTER
Refill per VO of Dr. Jimmy Young:    7/31/2023    Future Appointments   Date Time Provider Department Center   5/3/2024 11:40 AM Jimmy Young MD [facility]       Requested Prescriptions     Pending Prescriptions Disp Refills    lisinopril (PRINIVIL;ZESTRIL) 10 MG tablet [Pharmacy Med Name: Lisinopril 10 MG Oral Tablet] 100 tablet 2     Sig: TAKE 1 TABLET BY MOUTH DAILY

## 2024-05-31 ENCOUNTER — OFFICE VISIT (OUTPATIENT)
Age: 74
End: 2024-05-31
Payer: MEDICARE

## 2024-05-31 VITALS
HEART RATE: 60 BPM | BODY MASS INDEX: 34.67 KG/M2 | OXYGEN SATURATION: 99 % | DIASTOLIC BLOOD PRESSURE: 78 MMHG | HEIGHT: 60 IN | SYSTOLIC BLOOD PRESSURE: 112 MMHG | WEIGHT: 176.6 LBS

## 2024-05-31 DIAGNOSIS — R00.1 BRADYCARDIA, UNSPECIFIED: ICD-10-CM

## 2024-05-31 DIAGNOSIS — I10 ESSENTIAL (PRIMARY) HYPERTENSION: Primary | ICD-10-CM

## 2024-05-31 DIAGNOSIS — E78.2 MIXED HYPERLIPIDEMIA: ICD-10-CM

## 2024-05-31 PROCEDURE — G8417 CALC BMI ABV UP PARAM F/U: HCPCS | Performed by: INTERNAL MEDICINE

## 2024-05-31 PROCEDURE — 99213 OFFICE O/P EST LOW 20 MIN: CPT | Performed by: INTERNAL MEDICINE

## 2024-05-31 PROCEDURE — G8427 DOCREV CUR MEDS BY ELIG CLIN: HCPCS | Performed by: INTERNAL MEDICINE

## 2024-05-31 PROCEDURE — 3017F COLORECTAL CA SCREEN DOC REV: CPT | Performed by: INTERNAL MEDICINE

## 2024-05-31 PROCEDURE — 1090F PRES/ABSN URINE INCON ASSESS: CPT | Performed by: INTERNAL MEDICINE

## 2024-05-31 PROCEDURE — 3074F SYST BP LT 130 MM HG: CPT | Performed by: INTERNAL MEDICINE

## 2024-05-31 PROCEDURE — 3078F DIAST BP <80 MM HG: CPT | Performed by: INTERNAL MEDICINE

## 2024-05-31 PROCEDURE — G8400 PT W/DXA NO RESULTS DOC: HCPCS | Performed by: INTERNAL MEDICINE

## 2024-05-31 PROCEDURE — 1123F ACP DISCUSS/DSCN MKR DOCD: CPT | Performed by: INTERNAL MEDICINE

## 2024-05-31 PROCEDURE — 1036F TOBACCO NON-USER: CPT | Performed by: INTERNAL MEDICINE

## 2024-05-31 NOTE — PROGRESS NOTES
Chief Complaint   Patient presents with    Chest Pain    Hypertension    Cholesterol Problem     Vitals:    05/31/24 1052   BP: 112/78   Site: Left Upper Arm   Position: Sitting   Pulse: 60   SpO2: 99%   Weight: 80.1 kg (176 lb 9.6 oz)   Height: 1.524 m (5')     Chest pain: DULL TIGHTNESS     Recent hospital stays: DENIED     Refills: DENIED

## 2024-05-31 NOTE — PROGRESS NOTES
Office Follow-up    NAME: Alejandro Lazar   :  1950  MRM:  [id number]    Date:  24         Plan:     1.  CAD: Atypical chest pain. Will check stress Lexiscan.   Mild on LHC in .  Stress Nuc in May 2021 was normal.    2.  Hypertension: BP controlled. Cont. Lisinopril, HCTZ, Amlodipine, Toprol XL    3.  Bradycardia: This is improved since reducing the dosage of metoprolol to 25 mg p.o. daily.      4.  Dyslipidemia: Continue Zocor     5. Obesity: Has lost about 20lb.     6.  See Dr. Young in 1 year.           ATTENTION:   This medical record was transcribed using an electronic medical records/speech recognition system.  Although proofread, it may and can contain electronic, spelling and other errors.  Corrections may be executed at a later time.  Please feel free to contact us for any clarifications as needed.         Subjective:     No chest pain, SOB, palpitations or dizziness.     ----------------  Alejandro Lazar, a 73 y.o. year-old who presents for followup.  She has prior history of hypertension, dyslipidemia, bradycardia, PVCs, and chest pain.  I had recently seen her about a month ago for follow-up at that time she was doing fine however in past few days she has started experiencing some chest discomfort which she describes as pins-and-needles or aching sensation sometimes in the anterior left chest wall especially when she is exerting herself.  She also has noticed the symptoms when she is in stress.  No associated symptoms of shortness of breath, lightheadedness or dizziness.    EKG from 3/4/2021 demonstrates sinus bradycardia with heart rate of 55 bpm with occasional PVC.      Exam:     Physical Exam:  Visit Vitals  /78 (Site: Left Upper Arm, Position: Sitting)   Pulse 60   Ht 1.524 m (5')   Wt 80.1 kg (176 lb 9.6 oz)   SpO2 99%   BMI 34.49 kg/m²       General appearance - alert, well appearing, and in no distress  Mental status - affect appropriate to mood  Eyes - sclera

## 2024-06-10 RX ORDER — LISINOPRIL 10 MG/1
TABLET ORAL DAILY
Qty: 100 TABLET | Refills: 2 | Status: SHIPPED | OUTPATIENT
Start: 2024-06-10

## 2024-06-10 NOTE — TELEPHONE ENCOUNTER
Refill per VO of Dr. Jimmy Young:    5/31/2024    Future Appointments   Date Time Provider Department Center   6/5/2025 11:20 AM Jimmy Young MD [facility]       Requested Prescriptions     Pending Prescriptions Disp Refills    lisinopril (PRINIVIL;ZESTRIL) 10 MG tablet [Pharmacy Med Name: Lisinopril 10 MG Oral Tablet] 100 tablet 2     Sig: TAKE 1 TABLET BY MOUTH DAILY

## 2024-11-27 ENCOUNTER — OFFICE VISIT (OUTPATIENT)
Age: 74
End: 2024-11-27
Payer: MEDICARE

## 2024-11-27 VITALS
OXYGEN SATURATION: 98 % | HEART RATE: 61 BPM | DIASTOLIC BLOOD PRESSURE: 76 MMHG | BODY MASS INDEX: 31.8 KG/M2 | WEIGHT: 162 LBS | SYSTOLIC BLOOD PRESSURE: 120 MMHG | HEIGHT: 60 IN

## 2024-11-27 DIAGNOSIS — R07.9 CHEST PAIN, UNSPECIFIED TYPE: Primary | ICD-10-CM

## 2024-11-27 DIAGNOSIS — R00.1 BRADYCARDIA, UNSPECIFIED: ICD-10-CM

## 2024-11-27 DIAGNOSIS — R94.31 ABNORMAL EKG: ICD-10-CM

## 2024-11-27 DIAGNOSIS — E78.2 MIXED HYPERLIPIDEMIA: ICD-10-CM

## 2024-11-27 DIAGNOSIS — I10 ESSENTIAL (PRIMARY) HYPERTENSION: ICD-10-CM

## 2024-11-27 PROCEDURE — G8427 DOCREV CUR MEDS BY ELIG CLIN: HCPCS | Performed by: INTERNAL MEDICINE

## 2024-11-27 PROCEDURE — 1125F AMNT PAIN NOTED PAIN PRSNT: CPT | Performed by: INTERNAL MEDICINE

## 2024-11-27 PROCEDURE — G8484 FLU IMMUNIZE NO ADMIN: HCPCS | Performed by: INTERNAL MEDICINE

## 2024-11-27 PROCEDURE — 1036F TOBACCO NON-USER: CPT | Performed by: INTERNAL MEDICINE

## 2024-11-27 PROCEDURE — 99214 OFFICE O/P EST MOD 30 MIN: CPT | Performed by: INTERNAL MEDICINE

## 2024-11-27 PROCEDURE — G8400 PT W/DXA NO RESULTS DOC: HCPCS | Performed by: INTERNAL MEDICINE

## 2024-11-27 PROCEDURE — 1090F PRES/ABSN URINE INCON ASSESS: CPT | Performed by: INTERNAL MEDICINE

## 2024-11-27 PROCEDURE — 3074F SYST BP LT 130 MM HG: CPT | Performed by: INTERNAL MEDICINE

## 2024-11-27 PROCEDURE — G8417 CALC BMI ABV UP PARAM F/U: HCPCS | Performed by: INTERNAL MEDICINE

## 2024-11-27 PROCEDURE — 1159F MED LIST DOCD IN RCRD: CPT | Performed by: INTERNAL MEDICINE

## 2024-11-27 PROCEDURE — 1123F ACP DISCUSS/DSCN MKR DOCD: CPT | Performed by: INTERNAL MEDICINE

## 2024-11-27 PROCEDURE — 3017F COLORECTAL CA SCREEN DOC REV: CPT | Performed by: INTERNAL MEDICINE

## 2024-11-27 PROCEDURE — 3078F DIAST BP <80 MM HG: CPT | Performed by: INTERNAL MEDICINE

## 2024-11-27 RX ORDER — AMOXICILLIN 500 MG/1
CAPSULE ORAL
COMMUNITY
Start: 2024-11-12

## 2024-11-27 RX ORDER — ATENOLOL 25 MG/1
TABLET ORAL
Qty: 2 TABLET | Refills: 0 | Status: SHIPPED | OUTPATIENT
Start: 2024-11-27

## 2024-11-27 RX ORDER — ATENOLOL 25 MG/1
TABLET ORAL
Qty: 2 TABLET | Refills: 0 | Status: SHIPPED | OUTPATIENT
Start: 2024-11-27 | End: 2024-11-27 | Stop reason: SDUPTHER

## 2024-11-27 NOTE — PROGRESS NOTES
SHASHA. received from Dr. TODD Young MD:    CCTA- Chest pain.     Pt given atenolol instructions    Refill per VO of Dr. Jimmy Young:    5/31/2024    Future Appointments   Date Time Provider Department Center   6/5/2025 11:20 AM Jimmy Young MD [facility]       Requested Prescriptions     Signed Prescriptions Disp Refills    atenolol (TENORMIN) 25 MG tablet 2 tablet 0     Sig: Take one tablets the night before procedure; then one tablet 1 hour before procedure. For CT, one time order

## 2024-11-27 NOTE — PROGRESS NOTES
Chief Complaint   Patient presents with    Hypertension    Bradycardia    Chol     Vitals:    11/27/24 1430   BP: 120/76   Site: Left Upper Arm   Position: Sitting   Pulse: 61   SpO2: 98%   Weight: 73.5 kg (162 lb)   Height: 1.524 m (5')       Chest pain Yes comes and go's     ER, urgent care, or hospitalized outside of [facility] since your last visit?  NO     Refills NO

## 2024-11-27 NOTE — PROGRESS NOTES
Office Follow-up    NAME: Alejandro Lazar   :  1950  MRM:  [id number]    Date:  24         Plan:     1.  CAD: Atypical chest pain. Mild on LHC in 2016.  Stress Nuc in May 2021 and Aug 2023 was normal. Has been having occasional aching pain. Will check CCTA next available.     2.  Hypertension: BP controlled. Cont. Lisinopril, HCTZ, Amlodipine, Toprol XL    3.  Bradycardia: This is improved since reducing the dosage of metoprolol to 25 mg p.o. daily.      4.  Dyslipidemia: Continue Zocor     5. Obesity: Has lost about 20lb.     6.  See Dr. Young in 2025 as scheduled.          ATTENTION:   This medical record was transcribed using an electronic medical records/speech recognition system.  Although proofread, it may and can contain electronic, spelling and other errors.  Corrections may be executed at a later time.  Please feel free to contact us for any clarifications as needed.         Subjective:     No chest pain, SOB, palpitations or dizziness.     ----------------  Alejandro Lazar, a 73 y.o. year-old who presents for followup.  She has prior history of hypertension, dyslipidemia, bradycardia, PVCs, and chest pain.  I had recently seen her about a month ago for follow-up at that time she was doing fine however in past few days she has started experiencing some chest discomfort which she describes as pins-and-needles or aching sensation sometimes in the anterior left chest wall especially when she is exerting herself.  She also has noticed the symptoms when she is in stress.  No associated symptoms of shortness of breath, lightheadedness or dizziness.    EKG from 3/4/2021 demonstrates sinus bradycardia with heart rate of 55 bpm with occasional PVC.      Exam:     Physical Exam:  Visit Vitals  /76 (Site: Left Upper Arm, Position: Sitting)   Pulse 61   Ht 1.524 m (5')   Wt 73.5 kg (162 lb)   SpO2 98%   BMI 31.64 kg/m²       General appearance - alert, well appearing, and in no

## 2025-01-27 ENCOUNTER — HOSPITAL ENCOUNTER (OUTPATIENT)
Facility: HOSPITAL | Age: 75
Discharge: HOME | End: 2025-01-30
Attending: INTERNAL MEDICINE
Payer: MEDICARE

## 2025-01-27 VITALS — RESPIRATION RATE: 21 BRPM | SYSTOLIC BLOOD PRESSURE: 137 MMHG | HEART RATE: 53 BPM | DIASTOLIC BLOOD PRESSURE: 64 MMHG

## 2025-01-27 DIAGNOSIS — E78.2 MIXED HYPERLIPIDEMIA: ICD-10-CM

## 2025-01-27 DIAGNOSIS — R07.9 CHEST PAIN, UNSPECIFIED TYPE: ICD-10-CM

## 2025-01-27 DIAGNOSIS — R94.31 ABNORMAL EKG: ICD-10-CM

## 2025-01-27 DIAGNOSIS — R00.1 BRADYCARDIA, UNSPECIFIED: ICD-10-CM

## 2025-01-27 DIAGNOSIS — I10 ESSENTIAL (PRIMARY) HYPERTENSION: ICD-10-CM

## 2025-01-27 PROCEDURE — 6370000000 HC RX 637 (ALT 250 FOR IP): Performed by: INTERNAL MEDICINE

## 2025-01-27 PROCEDURE — 6360000004 HC RX CONTRAST MEDICATION: Performed by: INTERNAL MEDICINE

## 2025-01-27 PROCEDURE — 75574 CT ANGIO HRT W/3D IMAGE: CPT

## 2025-01-27 PROCEDURE — 82565 ASSAY OF CREATININE: CPT

## 2025-01-27 RX ORDER — IOPAMIDOL 755 MG/ML
100 INJECTION, SOLUTION INTRAVASCULAR
Status: COMPLETED | OUTPATIENT
Start: 2025-01-27 | End: 2025-01-27

## 2025-01-27 RX ORDER — NITROGLYCERIN 0.4 MG/1
0.8 TABLET SUBLINGUAL ONCE
Status: COMPLETED | OUTPATIENT
Start: 2025-01-27 | End: 2025-01-27

## 2025-01-27 RX ADMIN — IOPAMIDOL 80 ML: 755 INJECTION, SOLUTION INTRAVENOUS at 12:09

## 2025-01-27 RX ADMIN — NITROGLYCERIN 0.8 MG: 0.4 TABLET SUBLINGUAL at 11:59

## 2025-01-27 NOTE — PROGRESS NOTES
T: 1120  Patient retrieved from waiting area for scheduled Coronary CTA, reviewed allergies and contrast history. HR 53, /64.   20g Diffusics PIV placed to right AC,  brisk blood return noted.   Labs drawn and provided to CT staff for POCT Cr.       T: 1155  Notified CT ready for patient exam. Administered Nitro 0.8mg SL at 1159, escorted patient to CT via wheelchair. Placed on ECG and IV flushed for patency. Exam completed 1215, rechecked /71 HR  59. Patient denies dizziness or headache. PIV removed, encouraged hydration throughout remainder of day. Patient ambulated out of department at 1225.

## 2025-01-28 NOTE — RESULT ENCOUNTER NOTE
Pls notify>> CCTA show mild LM disease. CAC score is mildly elevated. The CAC score is 43  This means you have mild plaque buildup in your arteries of the heart. Usually this means no significant blockage and can be managed by with lifestyle changes and medications. Eat healthy diet that includes fresh vegetables, fruits, nuts, low carb foods, free of GMO and gluten. Reduce saturated fat, sugars, soda's. Aerobic exercise daily. Maintain a normal weight, stop smoking tobacco if you are currently a smoker.     Change Zocor to Crestor 20mg po daily.

## 2025-01-29 LAB — CREAT BLD-MCNC: 1.2 MG/DL (ref 0.6–1.3)

## 2025-01-30 ENCOUNTER — TELEPHONE (OUTPATIENT)
Age: 75
End: 2025-01-30

## 2025-01-30 RX ORDER — ROSUVASTATIN CALCIUM 20 MG/1
20 TABLET, COATED ORAL NIGHTLY
Qty: 90 TABLET | Refills: 3 | Status: SHIPPED | OUTPATIENT
Start: 2025-01-30

## 2025-01-30 NOTE — TELEPHONE ENCOUNTER
SHASHA. received from Dr. TODD Young MD:  CCTA show mild LM disease. CAC score is mildly elevated. The CAC score is 43   This means you have mild plaque buildup in your arteries of the heart. Usually this means no significant blockage and can be managed by with lifestyle changes and medications. Eat healthy diet that includes fresh vegetables, fruits, nuts, low carb foods, free of GMO and gluten. Reduce saturated fat, sugars, soda's. Aerobic exercise daily. Maintain a normal weight, stop smoking tobacco if you are currently a smoker.     Change Zocor to Crestor 20mg po daily.     ##################################    Spoke with the pt, identified the pt with name and .  Informed her of her CAC score and Dr. Young's recommendation. Pt verbalized understanding and agreement.  I verified her pharmacy    Refill per VO of Dr. Jimmy Young:    2024    Future Appointments   Date Time Provider Department Center   2025 11:20 AM Jimmy Young MD [facility]       Requested Prescriptions     Pending Prescriptions Disp Refills    rosuvastatin (CRESTOR) 20 MG tablet 90 tablet 3     Sig: Take 1 tablet by mouth nightly

## 2025-04-07 RX ORDER — LISINOPRIL 10 MG/1
TABLET ORAL DAILY
Qty: 90 TABLET | Refills: 1 | Status: SHIPPED | OUTPATIENT
Start: 2025-04-07

## 2025-06-05 ENCOUNTER — OFFICE VISIT (OUTPATIENT)
Age: 75
End: 2025-06-05
Payer: MEDICARE

## 2025-06-05 VITALS
DIASTOLIC BLOOD PRESSURE: 64 MMHG | OXYGEN SATURATION: 98 % | SYSTOLIC BLOOD PRESSURE: 118 MMHG | WEIGHT: 159.2 LBS | BODY MASS INDEX: 31.25 KG/M2 | HEART RATE: 64 BPM | HEIGHT: 60 IN

## 2025-06-05 DIAGNOSIS — R07.9 CHEST PAIN, UNSPECIFIED TYPE: ICD-10-CM

## 2025-06-05 DIAGNOSIS — R00.1 BRADYCARDIA, UNSPECIFIED: ICD-10-CM

## 2025-06-05 DIAGNOSIS — E78.2 MIXED HYPERLIPIDEMIA: ICD-10-CM

## 2025-06-05 DIAGNOSIS — I10 PRIMARY HYPERTENSION: ICD-10-CM

## 2025-06-05 DIAGNOSIS — I10 ESSENTIAL (PRIMARY) HYPERTENSION: Primary | ICD-10-CM

## 2025-06-05 PROCEDURE — 3078F DIAST BP <80 MM HG: CPT | Performed by: INTERNAL MEDICINE

## 2025-06-05 PROCEDURE — 3074F SYST BP LT 130 MM HG: CPT | Performed by: INTERNAL MEDICINE

## 2025-06-05 PROCEDURE — 1159F MED LIST DOCD IN RCRD: CPT | Performed by: INTERNAL MEDICINE

## 2025-06-05 PROCEDURE — 1123F ACP DISCUSS/DSCN MKR DOCD: CPT | Performed by: INTERNAL MEDICINE

## 2025-06-05 PROCEDURE — 99214 OFFICE O/P EST MOD 30 MIN: CPT | Performed by: INTERNAL MEDICINE

## 2025-06-05 PROCEDURE — 1160F RVW MEDS BY RX/DR IN RCRD: CPT | Performed by: INTERNAL MEDICINE

## 2025-06-05 RX ORDER — ERGOCALCIFEROL 1.25 MG/1
CAPSULE, LIQUID FILLED ORAL
COMMUNITY

## 2025-06-05 NOTE — PROGRESS NOTES
Office Follow-up    NAME: Alejandro Lazar   :  1950  MRM:  [id number]    Date:  25         Plan:     1.  CAD: Atypical chest pain.     Mild on LHC in .      Stress Nuc in May 2021 and Aug 2023 was normal. Has been having occasional aching pain.     CCTA 25-    -Mild calcified plaque of the ostial left main and ostial LAD without any significant luminal stenosis.  -CAD RADS 1/P1   - CAC score 43  - Repeat CAC score in     2.  Hypertension: BP controlled. Cont. Lisinopril, HCTZ, Amlodipine, Toprol XL    3.  Bradycardia: This is improved since reducing the dosage of metoprolol to 25 mg p.o. daily.      4.  Dyslipidemia: Continue Zocor     5. Obesity: Has lost about 20lb.     6.  See Dr. Young in 1yr.         ATTENTION:   This medical record was transcribed using an electronic medical records/speech recognition system.  Although proofread, it may and can contain electronic, spelling and other errors.  Corrections may be executed at a later time.  Please feel free to contact us for any clarifications as needed.         Subjective:     No chest pain, SOB, palpitations or dizziness.     ----------------  Alejandro Lazar, a 73 y.o. year-old who presents for followup.  She has prior history of hypertension, dyslipidemia, bradycardia, PVCs, and chest pain.  I had recently seen her about a month ago for follow-up at that time she was doing fine however in past few days she has started experiencing some chest discomfort which she describes as pins-and-needles or aching sensation sometimes in the anterior left chest wall especially when she is exerting herself.  She also has noticed the symptoms when she is in stress.  No associated symptoms of shortness of breath, lightheadedness or dizziness.    EKG from 3/4/2021 demonstrates sinus bradycardia with heart rate of 55 bpm with occasional PVC.      Exam:     Physical Exam:  Visit Vitals  /64 (BP Site: Left Upper Arm)   Pulse 64   Ht 1.524 m (5')

## 2025-06-05 NOTE — PROGRESS NOTES
Chief Complaint   Patient presents with    Chest Pain    Hypertension    Bradycardia     Vitals:    06/05/25 1110   BP: 118/64   BP Site: Left Upper Arm   Pulse: 64   SpO2: 98%   Weight: 72.2 kg (159 lb 3.2 oz)   Height: 1.524 m (5')       Chest pain NO     ER, urgent care, or hospitalized outside of [facility] since your last visit?  NO     Refills NO

## 2025-06-09 ENCOUNTER — HOSPITAL ENCOUNTER (INPATIENT)
Facility: HOSPITAL | Age: 75
LOS: 3 days | Discharge: HOME | DRG: 193 | End: 2025-06-12
Attending: EMERGENCY MEDICINE | Admitting: INTERNAL MEDICINE
Payer: MEDICARE

## 2025-06-09 ENCOUNTER — APPOINTMENT (OUTPATIENT)
Facility: HOSPITAL | Age: 75
DRG: 193 | End: 2025-06-09
Payer: MEDICARE

## 2025-06-09 ENCOUNTER — HOSPITAL ENCOUNTER (INPATIENT)
Facility: HOSPITAL | Age: 75
Discharge: HOME | DRG: 193 | End: 2025-06-12
Payer: MEDICARE

## 2025-06-09 DIAGNOSIS — J96.01 ACUTE RESPIRATORY FAILURE WITH HYPOXIA (HCC): Primary | ICD-10-CM

## 2025-06-09 DIAGNOSIS — J18.9 COMMUNITY ACQUIRED PNEUMONIA OF RIGHT LOWER LOBE OF LUNG: ICD-10-CM

## 2025-06-09 DIAGNOSIS — N17.9 AKI (ACUTE KIDNEY INJURY): ICD-10-CM

## 2025-06-09 DIAGNOSIS — R07.9 CHEST PAIN, UNSPECIFIED TYPE: ICD-10-CM

## 2025-06-09 LAB
ALBUMIN SERPL-MCNC: 3.5 G/DL (ref 3.5–5)
ALBUMIN/GLOB SERPL: 1 (ref 1.1–2.2)
ALP SERPL-CCNC: 76 U/L (ref 45–117)
ALT SERPL-CCNC: 14 U/L (ref 12–78)
ANION GAP SERPL CALC-SCNC: 5 MMOL/L (ref 2–12)
APPEARANCE UR: ABNORMAL
AST SERPL-CCNC: 16 U/L (ref 15–37)
B PERT DNA SPEC QL NAA+PROBE: NOT DETECTED
BACTERIA URNS QL MICRO: NEGATIVE /HPF
BASOPHILS # BLD: 0.06 K/UL (ref 0–0.1)
BASOPHILS NFR BLD: 0.4 % (ref 0–1)
BILIRUB SERPL-MCNC: 0.8 MG/DL (ref 0.2–1)
BILIRUB UR QL: NEGATIVE
BORDETELLA PARAPERTUSSIS BY PCR: NOT DETECTED
BUN SERPL-MCNC: 36 MG/DL (ref 6–20)
BUN/CREAT SERPL: 18 (ref 12–20)
C PNEUM DNA SPEC QL NAA+PROBE: NOT DETECTED
CALCIUM SERPL-MCNC: 9.5 MG/DL (ref 8.5–10.1)
CHLORIDE SERPL-SCNC: 105 MMOL/L (ref 97–108)
CK SERPL-CCNC: 169 U/L (ref 26–192)
CO2 SERPL-SCNC: 29 MMOL/L (ref 21–32)
COLOR UR: ABNORMAL
COMMENT:: NORMAL
CREAT SERPL-MCNC: 2.02 MG/DL (ref 0.55–1.02)
CRP SERPL-MCNC: 7.95 MG/DL (ref 0–0.3)
DIFFERENTIAL METHOD BLD: ABNORMAL
EKG ATRIAL RATE: 79 BPM
EKG DIAGNOSIS: NORMAL
EKG P AXIS: 33 DEGREES
EKG P-R INTERVAL: 172 MS
EKG Q-T INTERVAL: 358 MS
EKG QRS DURATION: 70 MS
EKG QTC CALCULATION (BAZETT): 410 MS
EKG R AXIS: 38 DEGREES
EKG T AXIS: 13 DEGREES
EKG VENTRICULAR RATE: 79 BPM
EOSINOPHIL # BLD: 0.16 K/UL (ref 0–0.4)
EOSINOPHIL NFR BLD: 1.1 % (ref 0–7)
EPITH CASTS URNS QL MICRO: ABNORMAL /LPF
ERYTHROCYTE [DISTWIDTH] IN BLOOD BY AUTOMATED COUNT: 14.5 % (ref 11.5–14.5)
FLUAV RNA SPEC QL NAA+PROBE: NOT DETECTED
FLUAV SUBTYP SPEC NAA+PROBE: NOT DETECTED
FLUBV RNA SPEC QL NAA+PROBE: NOT DETECTED
FLUBV RNA SPEC QL NAA+PROBE: NOT DETECTED
GLOBULIN SER CALC-MCNC: 3.5 G/DL (ref 2–4)
GLUCOSE BLD STRIP.AUTO-MCNC: 213 MG/DL (ref 65–117)
GLUCOSE BLD STRIP.AUTO-MCNC: 215 MG/DL (ref 65–117)
GLUCOSE BLD STRIP.AUTO-MCNC: 266 MG/DL (ref 65–117)
GLUCOSE SERPL-MCNC: 101 MG/DL (ref 65–100)
GLUCOSE UR STRIP.AUTO-MCNC: NEGATIVE MG/DL
HADV DNA SPEC QL NAA+PROBE: NOT DETECTED
HCOV 229E RNA SPEC QL NAA+PROBE: NOT DETECTED
HCOV HKU1 RNA SPEC QL NAA+PROBE: NOT DETECTED
HCOV NL63 RNA SPEC QL NAA+PROBE: NOT DETECTED
HCOV OC43 RNA SPEC QL NAA+PROBE: NOT DETECTED
HCT VFR BLD AUTO: 38.6 % (ref 35–47)
HGB BLD-MCNC: 12.6 G/DL (ref 11.5–16)
HGB UR QL STRIP: ABNORMAL
HMPV RNA SPEC QL NAA+PROBE: NOT DETECTED
HPIV1 RNA SPEC QL NAA+PROBE: NOT DETECTED
HPIV2 RNA SPEC QL NAA+PROBE: NOT DETECTED
HPIV3 RNA SPEC QL NAA+PROBE: NOT DETECTED
HPIV4 RNA SPEC QL NAA+PROBE: NOT DETECTED
IMM GRANULOCYTES # BLD AUTO: 0.04 K/UL (ref 0–0.04)
IMM GRANULOCYTES NFR BLD AUTO: 0.3 % (ref 0–0.5)
KETONES UR QL STRIP.AUTO: NEGATIVE MG/DL
LACTATE BLD-SCNC: 1.54 MMOL/L (ref 0.4–2)
LEUKOCYTE ESTERASE UR QL STRIP.AUTO: NEGATIVE
LYMPHOCYTES # BLD: 2.15 K/UL (ref 0.8–3.5)
LYMPHOCYTES NFR BLD: 14.2 % (ref 12–49)
M PNEUMO DNA SPEC QL NAA+PROBE: NOT DETECTED
MAGNESIUM SERPL-MCNC: 1.6 MG/DL (ref 1.6–2.4)
MCH RBC QN AUTO: 27.1 PG (ref 26–34)
MCHC RBC AUTO-ENTMCNC: 32.6 G/DL (ref 30–36.5)
MCV RBC AUTO: 83 FL (ref 80–99)
MONOCYTES # BLD: 0.45 K/UL (ref 0–1)
MONOCYTES NFR BLD: 3 % (ref 5–13)
NEUTS SEG # BLD: 12.29 K/UL (ref 1.8–8)
NEUTS SEG NFR BLD: 81 % (ref 32–75)
NITRITE UR QL STRIP.AUTO: NEGATIVE
NRBC # BLD: 0 K/UL (ref 0–0.01)
NRBC BLD-RTO: 0 PER 100 WBC
NT PRO BNP: 167 PG/ML
PH UR STRIP: 5 (ref 5–8)
PHOSPHATE SERPL-MCNC: 2.3 MG/DL (ref 2.6–4.7)
PLATELET # BLD AUTO: 290 K/UL (ref 150–400)
PMV BLD AUTO: 11.1 FL (ref 8.9–12.9)
POTASSIUM SERPL-SCNC: 3.8 MMOL/L (ref 3.5–5.1)
PROCALCITONIN SERPL-MCNC: 0.25 NG/ML
PROT SERPL-MCNC: 7 G/DL (ref 6.4–8.2)
PROT UR STRIP-MCNC: 30 MG/DL
RBC # BLD AUTO: 4.65 M/UL (ref 3.8–5.2)
RBC #/AREA URNS HPF: ABNORMAL /HPF (ref 0–5)
RSV RNA SPEC QL NAA+PROBE: NOT DETECTED
RV+EV RNA SPEC QL NAA+PROBE: NOT DETECTED
SARS-COV-2 RNA RESP QL NAA+PROBE: NOT DETECTED
SARS-COV-2 RNA RESP QL NAA+PROBE: NOT DETECTED
SERVICE CMNT-IMP: ABNORMAL
SODIUM SERPL-SCNC: 139 MMOL/L (ref 136–145)
SOURCE: NORMAL
SP GR UR REFRACTOMETRY: <1.005 (ref 1–1.03)
SPECIMEN HOLD: NORMAL
TROPONIN I SERPL HS-MCNC: 4 NG/L (ref 0–51)
URINE CULTURE IF INDICATED: ABNORMAL
UROBILINOGEN UR QL STRIP.AUTO: 0.2 EU/DL (ref 0.2–1)
WBC # BLD AUTO: 15.2 K/UL (ref 3.6–11)
WBC URNS QL MICRO: ABNORMAL /HPF (ref 0–4)

## 2025-06-09 PROCEDURE — 1100000000 HC RM PRIVATE

## 2025-06-09 PROCEDURE — 87070 CULTURE OTHR SPECIMN AEROBIC: CPT

## 2025-06-09 PROCEDURE — 6370000000 HC RX 637 (ALT 250 FOR IP): Performed by: EMERGENCY MEDICINE

## 2025-06-09 PROCEDURE — 87205 SMEAR GRAM STAIN: CPT

## 2025-06-09 PROCEDURE — 87449 NOS EACH ORGANISM AG IA: CPT

## 2025-06-09 PROCEDURE — 99285 EMERGENCY DEPT VISIT HI MDM: CPT

## 2025-06-09 PROCEDURE — 6360000002 HC RX W HCPCS: Performed by: INTERNAL MEDICINE

## 2025-06-09 PROCEDURE — 2580000003 HC RX 258: Performed by: EMERGENCY MEDICINE

## 2025-06-09 PROCEDURE — 6370000000 HC RX 637 (ALT 250 FOR IP): Performed by: INTERNAL MEDICINE

## 2025-06-09 PROCEDURE — 97161 PT EVAL LOW COMPLEX 20 MIN: CPT

## 2025-06-09 PROCEDURE — 83735 ASSAY OF MAGNESIUM: CPT

## 2025-06-09 PROCEDURE — 80053 COMPREHEN METABOLIC PANEL: CPT

## 2025-06-09 PROCEDURE — 96374 THER/PROPH/DIAG INJ IV PUSH: CPT

## 2025-06-09 PROCEDURE — 81001 URINALYSIS AUTO W/SCOPE: CPT

## 2025-06-09 PROCEDURE — 71275 CT ANGIOGRAPHY CHEST: CPT

## 2025-06-09 PROCEDURE — 83605 ASSAY OF LACTIC ACID: CPT

## 2025-06-09 PROCEDURE — 2500000003 HC RX 250 WO HCPCS: Performed by: INTERNAL MEDICINE

## 2025-06-09 PROCEDURE — 82962 GLUCOSE BLOOD TEST: CPT

## 2025-06-09 PROCEDURE — 87636 SARSCOV2 & INF A&B AMP PRB: CPT

## 2025-06-09 PROCEDURE — 87040 BLOOD CULTURE FOR BACTERIA: CPT

## 2025-06-09 PROCEDURE — 97116 GAIT TRAINING THERAPY: CPT

## 2025-06-09 PROCEDURE — 83880 ASSAY OF NATRIURETIC PEPTIDE: CPT

## 2025-06-09 PROCEDURE — 94640 AIRWAY INHALATION TREATMENT: CPT

## 2025-06-09 PROCEDURE — 2500000003 HC RX 250 WO HCPCS: Performed by: EMERGENCY MEDICINE

## 2025-06-09 PROCEDURE — 76770 US EXAM ABDO BACK WALL COMP: CPT

## 2025-06-09 PROCEDURE — 36415 COLL VENOUS BLD VENIPUNCTURE: CPT

## 2025-06-09 PROCEDURE — 0202U NFCT DS 22 TRGT SARS-COV-2: CPT

## 2025-06-09 PROCEDURE — 6360000004 HC RX CONTRAST MEDICATION: Performed by: EMERGENCY MEDICINE

## 2025-06-09 PROCEDURE — 96375 TX/PRO/DX INJ NEW DRUG ADDON: CPT

## 2025-06-09 PROCEDURE — 84484 ASSAY OF TROPONIN QUANT: CPT

## 2025-06-09 PROCEDURE — 93010 ELECTROCARDIOGRAM REPORT: CPT | Performed by: SPECIALIST

## 2025-06-09 PROCEDURE — 86140 C-REACTIVE PROTEIN: CPT

## 2025-06-09 PROCEDURE — 6360000002 HC RX W HCPCS: Performed by: EMERGENCY MEDICINE

## 2025-06-09 PROCEDURE — 2580000003 HC RX 258: Performed by: INTERNAL MEDICINE

## 2025-06-09 PROCEDURE — 84145 PROCALCITONIN (PCT): CPT

## 2025-06-09 PROCEDURE — 84100 ASSAY OF PHOSPHORUS: CPT

## 2025-06-09 PROCEDURE — 85025 COMPLETE CBC W/AUTO DIFF WBC: CPT

## 2025-06-09 PROCEDURE — 93005 ELECTROCARDIOGRAM TRACING: CPT | Performed by: EMERGENCY MEDICINE

## 2025-06-09 PROCEDURE — 71045 X-RAY EXAM CHEST 1 VIEW: CPT

## 2025-06-09 PROCEDURE — 82550 ASSAY OF CK (CPK): CPT

## 2025-06-09 RX ORDER — AMLODIPINE BESYLATE 5 MG/1
5 TABLET ORAL DAILY
Status: DISCONTINUED | OUTPATIENT
Start: 2025-06-09 | End: 2025-06-12 | Stop reason: HOSPADM

## 2025-06-09 RX ORDER — SODIUM CHLORIDE 0.9 % (FLUSH) 0.9 %
5-40 SYRINGE (ML) INJECTION PRN
Status: DISCONTINUED | OUTPATIENT
Start: 2025-06-09 | End: 2025-06-12 | Stop reason: HOSPADM

## 2025-06-09 RX ORDER — SODIUM CHLORIDE 9 MG/ML
INJECTION, SOLUTION INTRAVENOUS PRN
Status: DISCONTINUED | OUTPATIENT
Start: 2025-06-09 | End: 2025-06-12 | Stop reason: HOSPADM

## 2025-06-09 RX ORDER — ACETAMINOPHEN 325 MG/1
650 TABLET ORAL EVERY 6 HOURS PRN
Status: DISCONTINUED | OUTPATIENT
Start: 2025-06-09 | End: 2025-06-12 | Stop reason: HOSPADM

## 2025-06-09 RX ORDER — SODIUM CHLORIDE 0.9 % (FLUSH) 0.9 %
5-40 SYRINGE (ML) INJECTION EVERY 12 HOURS SCHEDULED
Status: DISCONTINUED | OUTPATIENT
Start: 2025-06-09 | End: 2025-06-12 | Stop reason: HOSPADM

## 2025-06-09 RX ORDER — FAMOTIDINE 20 MG/1
20 TABLET, FILM COATED ORAL NIGHTLY
Status: DISCONTINUED | OUTPATIENT
Start: 2025-06-09 | End: 2025-06-12 | Stop reason: HOSPADM

## 2025-06-09 RX ORDER — SENNA AND DOCUSATE SODIUM 50; 8.6 MG/1; MG/1
1 TABLET, FILM COATED ORAL 2 TIMES DAILY
Status: DISCONTINUED | OUTPATIENT
Start: 2025-06-10 | End: 2025-06-12 | Stop reason: HOSPADM

## 2025-06-09 RX ORDER — ONDANSETRON 2 MG/ML
4 INJECTION INTRAMUSCULAR; INTRAVENOUS EVERY 6 HOURS PRN
Status: DISCONTINUED | OUTPATIENT
Start: 2025-06-09 | End: 2025-06-12 | Stop reason: HOSPADM

## 2025-06-09 RX ORDER — 0.9 % SODIUM CHLORIDE 0.9 %
1000 INTRAVENOUS SOLUTION INTRAVENOUS ONCE
Status: COMPLETED | OUTPATIENT
Start: 2025-06-09 | End: 2025-06-09

## 2025-06-09 RX ORDER — INSULIN LISPRO 100 [IU]/ML
0-8 INJECTION, SOLUTION INTRAVENOUS; SUBCUTANEOUS
Status: DISCONTINUED | OUTPATIENT
Start: 2025-06-09 | End: 2025-06-12 | Stop reason: HOSPADM

## 2025-06-09 RX ORDER — PANTOPRAZOLE SODIUM 40 MG/1
40 TABLET, DELAYED RELEASE ORAL
Status: DISCONTINUED | OUTPATIENT
Start: 2025-06-09 | End: 2025-06-12 | Stop reason: HOSPADM

## 2025-06-09 RX ORDER — POLYETHYLENE GLYCOL 3350 17 G/17G
17 POWDER, FOR SOLUTION ORAL DAILY PRN
Status: DISCONTINUED | OUTPATIENT
Start: 2025-06-09 | End: 2025-06-12 | Stop reason: HOSPADM

## 2025-06-09 RX ORDER — BENZONATATE 100 MG/1
100 CAPSULE ORAL 3 TIMES DAILY PRN
Status: DISCONTINUED | OUTPATIENT
Start: 2025-06-09 | End: 2025-06-12 | Stop reason: HOSPADM

## 2025-06-09 RX ORDER — ACETAMINOPHEN 650 MG/1
650 SUPPOSITORY RECTAL EVERY 6 HOURS PRN
Status: DISCONTINUED | OUTPATIENT
Start: 2025-06-09 | End: 2025-06-12 | Stop reason: HOSPADM

## 2025-06-09 RX ORDER — GUAIFENESIN 600 MG/1
600 TABLET, EXTENDED RELEASE ORAL 2 TIMES DAILY
Status: DISCONTINUED | OUTPATIENT
Start: 2025-06-09 | End: 2025-06-12 | Stop reason: HOSPADM

## 2025-06-09 RX ORDER — ASPIRIN 81 MG/1
81 TABLET ORAL DAILY
Status: DISCONTINUED | OUTPATIENT
Start: 2025-06-09 | End: 2025-06-12 | Stop reason: HOSPADM

## 2025-06-09 RX ORDER — ENOXAPARIN SODIUM 100 MG/ML
30 INJECTION SUBCUTANEOUS DAILY
Status: DISCONTINUED | OUTPATIENT
Start: 2025-06-09 | End: 2025-06-12 | Stop reason: HOSPADM

## 2025-06-09 RX ORDER — IPRATROPIUM BROMIDE AND ALBUTEROL SULFATE 2.5; .5 MG/3ML; MG/3ML
1 SOLUTION RESPIRATORY (INHALATION) EVERY 4 HOURS PRN
Status: DISCONTINUED | OUTPATIENT
Start: 2025-06-09 | End: 2025-06-12 | Stop reason: HOSPADM

## 2025-06-09 RX ORDER — OXYCODONE HYDROCHLORIDE 5 MG/1
5 TABLET ORAL EVERY 4 HOURS PRN
Refills: 0 | Status: DISCONTINUED | OUTPATIENT
Start: 2025-06-09 | End: 2025-06-12 | Stop reason: HOSPADM

## 2025-06-09 RX ORDER — IOPAMIDOL 755 MG/ML
100 INJECTION, SOLUTION INTRAVASCULAR
Status: COMPLETED | OUTPATIENT
Start: 2025-06-09 | End: 2025-06-09

## 2025-06-09 RX ORDER — METOPROLOL SUCCINATE 25 MG/1
25 TABLET, EXTENDED RELEASE ORAL DAILY
Status: DISCONTINUED | OUTPATIENT
Start: 2025-06-09 | End: 2025-06-12 | Stop reason: HOSPADM

## 2025-06-09 RX ORDER — SODIUM CHLORIDE 9 MG/ML
INJECTION, SOLUTION INTRAVENOUS CONTINUOUS
Status: DISCONTINUED | OUTPATIENT
Start: 2025-06-09 | End: 2025-06-11

## 2025-06-09 RX ADMIN — WATER 2000 MG: 1 INJECTION INTRAMUSCULAR; INTRAVENOUS; SUBCUTANEOUS at 14:17

## 2025-06-09 RX ADMIN — ASPIRIN 81 MG: 81 TABLET, COATED ORAL at 10:59

## 2025-06-09 RX ADMIN — CEFEPIME 1000 MG: 2 INJECTION, POWDER, FOR SOLUTION INTRAVENOUS at 07:50

## 2025-06-09 RX ADMIN — ALBUTEROL SULFATE 1 DOSE: 2.5 SOLUTION RESPIRATORY (INHALATION) at 06:17

## 2025-06-09 RX ADMIN — AZITHROMYCIN MONOHYDRATE 500 MG: 500 INJECTION, POWDER, LYOPHILIZED, FOR SOLUTION INTRAVENOUS at 16:02

## 2025-06-09 RX ADMIN — INSULIN LISPRO 4 UNITS: 100 INJECTION, SOLUTION INTRAVENOUS; SUBCUTANEOUS at 21:16

## 2025-06-09 RX ADMIN — ENOXAPARIN SODIUM 30 MG: 100 INJECTION SUBCUTANEOUS at 10:59

## 2025-06-09 RX ADMIN — IOPAMIDOL 100 ML: 755 INJECTION, SOLUTION INTRAVENOUS at 07:04

## 2025-06-09 RX ADMIN — PANTOPRAZOLE SODIUM 40 MG: 40 TABLET, DELAYED RELEASE ORAL at 10:59

## 2025-06-09 RX ADMIN — METOPROLOL SUCCINATE 25 MG: 25 TABLET, EXTENDED RELEASE ORAL at 10:59

## 2025-06-09 RX ADMIN — SODIUM CHLORIDE: 0.9 INJECTION, SOLUTION INTRAVENOUS at 21:14

## 2025-06-09 RX ADMIN — FAMOTIDINE 20 MG: 20 TABLET, FILM COATED ORAL at 21:16

## 2025-06-09 RX ADMIN — SODIUM CHLORIDE 1000 ML: 0.9 INJECTION, SOLUTION INTRAVENOUS at 07:48

## 2025-06-09 RX ADMIN — SODIUM CHLORIDE: 0.9 INJECTION, SOLUTION INTRAVENOUS at 11:03

## 2025-06-09 RX ADMIN — INSULIN LISPRO 2 UNITS: 100 INJECTION, SOLUTION INTRAVENOUS; SUBCUTANEOUS at 17:11

## 2025-06-09 RX ADMIN — ACETAMINOPHEN 650 MG: 325 TABLET ORAL at 14:16

## 2025-06-09 RX ADMIN — METHYLPREDNISOLONE SODIUM SUCCINATE 125 MG: 125 INJECTION INTRAMUSCULAR; INTRAVENOUS at 07:57

## 2025-06-09 RX ADMIN — GUAIFENESIN 600 MG: 600 TABLET, EXTENDED RELEASE ORAL at 21:16

## 2025-06-09 RX ADMIN — INSULIN LISPRO 2 UNITS: 100 INJECTION, SOLUTION INTRAVENOUS; SUBCUTANEOUS at 11:33

## 2025-06-09 RX ADMIN — AMLODIPINE BESYLATE 5 MG: 5 TABLET ORAL at 10:59

## 2025-06-09 RX ADMIN — GUAIFENESIN 600 MG: 600 TABLET, EXTENDED RELEASE ORAL at 10:59

## 2025-06-09 RX ADMIN — DOXYCYCLINE 100 MG: 100 INJECTION, POWDER, LYOPHILIZED, FOR SOLUTION INTRAVENOUS at 08:14

## 2025-06-09 ASSESSMENT — PAIN - FUNCTIONAL ASSESSMENT: PAIN_FUNCTIONAL_ASSESSMENT: NONE - DENIES PAIN

## 2025-06-09 NOTE — PROGRESS NOTES
Ultrasound IV by EFREN Jacobs :  Procedure Note    Ultrasound IV education provided to patient. Opportunities for questions given.     Ultrasound used for PIV placement:  20 gauge 5.7 cm AccuCath Ace 5.7cm  left cephalic} location.  1 X Attempt(s).    Vigorous blood return present and saline flushes with ease.     Procedure tolerated well. Primary RN aware of IV placement and added to LDA.      Mary Carmen Jacobs RN

## 2025-06-09 NOTE — ED PROVIDER NOTES
[facility]– [place] 2[place] EMERGENCY DEPARTMENT  EMERGENCY DEPARTMENT ENCOUNTER      Pt Name: Alejandro Lazar  MRN: [id number]  Birthdate 1950  Date of evaluation: 6/9/2025  Provider: Stanislaw Felipe MD    CHIEF COMPLAINT       Chief Complaint   Patient presents with    Chest Pain    Shortness of Breath         HISTORY OF PRESENT ILLNESS   (Location/Symptom, Timing/Onset, Context/Setting, Quality, Duration, Modifying Factors, Severity)  Note limiting factors.   75-year-old female with a past medical history significant for arthritis, diabetes, GERD, hyperlipidemia and hypertension who presents to the ER with a complaint of shortness of breath that began this evening after she woke up from sleep with sharp stabbing chest pain radiating to the back and shoulder.  EMS that the patient did from a oxygenation of 85% and was placed on supplemental oxygen of 4 L nasal cannula and transported to the ER for further evaluation.  Patient does admit to dry cough and congestion and bodyaches.  She denies any headache, neck and back pain, nausea and vomiting, diarrhea constipation, sick contact, skin rash, cigarette smoke a prior history of the same.            Review of External Medical Records:     Nursing Notes were reviewed.    REVIEW OF SYSTEMS    (2-9 systems for level 4, 10 or more for level 5)     Review of Systems   All other systems reviewed and are negative.      Except as noted above the remainder of the review of systems was reviewed and negative.       PAST MEDICAL HISTORY     Past Medical History:   Diagnosis Date    Arthritis     Diabetes (HCC)     GERD (gastroesophageal reflux disease)     Hyperlipemia     Hypertension          SURGICAL HISTORY       Past Surgical History:   Procedure Laterality Date    CARPAL TUNNEL RELEASE      COLONOSCOPY N/A 10/18/2022    COLONOSCOPY performed by Nicanor Barajas MD at [facility] ENDOSCOPY    HEENT  1974    tonsilectomy    HYSTERECTOMY (CERVIX STATUS UNKNOWN)      KNEE  ARTHROSCOPY           CURRENT MEDICATIONS       Previous Medications    AMLODIPINE (NORVASC) 5 MG TABLET    Take 1 tablet by mouth daily    ASPIRIN 81 MG EC TABLET    Take by mouth daily    CHOLECALCIFEROL 1.25 MG (06248 UT) TABS    Take by mouth every 7 days    FAMOTIDINE (PEPCID) 20 MG TABLET    Take 1 tablet by mouth nightly    HYDROCHLOROTHIAZIDE (HYDRODIURIL) 25 MG TABLET    Take 1 tablet by mouth daily    LANSOPRAZOLE (PREVACID) 30 MG DELAYED RELEASE CAPSULE    Take by mouth every morning (before breakfast)    LISINOPRIL (PRINIVIL;ZESTRIL) 10 MG TABLET    TAKE 1 TABLET BY MOUTH DAILY    METFORMIN (GLUCOPHAGE) 500 MG TABLET    Take 1.5 tablets by mouth Daily    METOPROLOL SUCCINATE (TOPROL XL) 25 MG EXTENDED RELEASE TABLET    Take 1 tablet by mouth    VITAMIN D (ERGOCALCIFEROL) 1.25 MG (52455 UT) CAPS CAPSULE    1.25 MG (06722 UT)  , TAKE 1 CAPSULE BY MOUTH 1 TIME A WEEK       ALLERGIES     Clindamycin and Codeine    FAMILY HISTORY       Family History   Problem Relation Age of Onset    Heart Disease Father     Cancer Father         oral    Cancer Sister         breast    Diabetes Other     Hypertension Other           SOCIAL HISTORY       Social History     Socioeconomic History    Marital status:      Spouse name: None    Number of children: None    Years of education: None    Highest education level: None   Tobacco Use    Smoking status: Former     Current packs/day: 0.00     Average packs/day: 1 pack/day for 7.0 years (7.0 ttl pk-yrs)     Types: Cigarettes     Start date:      Quit date:      Years since quittin.4    Smokeless tobacco: Never   Substance and Sexual Activity    Alcohol use: No    Drug use: No           PHYSICAL EXAM    (up to 7 for level 4, 8 or more for level 5)     ED Triage Vitals [25 0543]   BP Systolic BP Percentile Diastolic BP Percentile Temp Temp Source Pulse Respirations SpO2   (!) 117/95 -- -- 98.7 °F (37.1 °C) Oral 83 19 (!) 88 %      Height Weight - Scale

## 2025-06-09 NOTE — PROGRESS NOTES
PHYSICAL THERAPY EVALUATION/DISCHARGE    Patient: Alejandro Lazar (75 y.o. female)  Date: 6/9/2025  Primary Diagnosis: Pneumonia due to organism [J18.9]  GARRISON (acute kidney injury) [N17.9]  Acute respiratory failure with hypoxia (HCC) [J96.01]  Chest pain, unspecified type [R07.9]  Community acquired pneumonia of right lower lobe of lung [J18.9]       Precautions: Restrictions/Precautions  Restrictions/Precautions: Fall Risk            ASSESSMENT AND RECOMMENDATIONS:  Based on the objective data below, the patient presented on 6/9 with reports of chest pain and found to be hypoxic and was diagnosed with PNA. Pt received supine in bed on room air. Pt mobilized at independent level for bed mobility, functional transfers and gait training without AD. Pt without LOB or instability noted. SPO2: 94-95% on room air during exertion. Pt appears to be mobilizing at baseline mobility status with no further acute PT needs. Recommend OOB and ambulate with RN staff to prevent deconditioning during remaining hospital stay.    Functional Outcome Measure:  The patient scored 24/24 on the [facility] mobility outcome measure which is indicative of not needing additional therapy.      Further skilled acute physical therapy is not indicated at this time.       PLAN :  Recommendation for discharge: (in order for the patient to meet his/her long term goals):   No skilled physical therapy    Other factors to consider for discharge: no additional factors    IF patient discharges home will need the following DME: none       SUBJECTIVE:   Patient stated “I love to play Bingo.”    OBJECTIVE DATA SUMMARY:     Past Medical History:   Diagnosis Date    Arthritis     Diabetes (HCC)     GERD (gastroesophageal reflux disease)     Hyperlipemia     Hypertension      Past Surgical History:   Procedure Laterality Date    CARPAL TUNNEL RELEASE      COLONOSCOPY N/A 10/18/2022    COLONOSCOPY performed by Nicanor Barajas MD at [facility] ENDOSCOPY    HEENT  1974

## 2025-06-09 NOTE — H&P
[facility]  [address] [place] [address] [phone]        Hospitalist Admission History and Physical      NAME:  Alejandro Lazar   :   1950   MRN:  [id number]     PCP:  Petra Montoya DO     Date/Time of service:  2025  8:45 AM        Subjective:     CHIEF COMPLAINT: cough, chest pain     HISTORY OF PRESENT ILLNESS:     The patient is a 76 yo hx of HTN, DM, presented w/ cough, hypoxia, chest pain, found to have a RLL pneumonia, GARRISON.  The patient c/o dyspnea last night, associated with a productive cough of brown sputum, fevers/chills, and pleuritic chest pain.  She denied nausea, vomiting, diarrhea, or sick contacts.  In the ED, WBC was 15.2.  Chest CT negative for PE, but showed a RLL pneumonia.      Allergies   Allergen Reactions    Clindamycin Itching    Codeine Itching       Prior to Admission medications    Medication Sig Start Date End Date Taking? Authorizing Provider   vitamin D (ERGOCALCIFEROL) 1.25 MG (54768 UT) CAPS capsule 1.25 MG (14314 UT)  , TAKE 1 CAPSULE BY MOUTH 1 TIME A WEEK    Provider, MD Uche   lisinopril (PRINIVIL;ZESTRIL) 10 MG tablet TAKE 1 TABLET BY MOUTH DAILY 25   Jimmy Young MD   amLODIPine (NORVASC) 5 MG tablet Take 1 tablet by mouth daily 3/2/20   Automatic Reconciliation, Ar   aspirin 81 MG EC tablet Take by mouth daily    Automatic Reconciliation, Ar   Cholecalciferol 1.25 MG (49617 UT) TABS Take by mouth every 7 days    Automatic Reconciliation, Ar   famotidine (PEPCID) 20 MG tablet Take 1 tablet by mouth nightly    Automatic Reconciliation, Ar   hydroCHLOROthiazide (HYDRODIURIL) 25 MG tablet Take 1 tablet by mouth daily 5/3/21   Automatic Reconciliation, Ar   lansoprazole (PREVACID) 30 MG delayed release capsule Take by mouth every morning (before breakfast)    Automatic Reconciliation, Ar   metFORMIN (GLUCOPHAGE) 500 MG tablet Take 1.5 tablets by mouth Daily    Automatic Reconciliation, Ar  or peripheral edema  Abd:  Soft, non-tender, non-distended, normoactive bowel sounds are present  Lymph:  No cervical adenopathy  Musc:  No cyanosis or clubbing  Skin:  No rashes  Neuro:  Cranial nerves 3-12 are grossly intact, follows commands appropriately  Psych:  Alert with good insight.  Oriented to person, place, and time    Labs:    Recent Labs     06/09/25  0552   WBC 15.2*   HGB 12.6   HCT 38.6        Recent Labs     06/09/25  0552      K 3.8      CO2 29   BUN 36*   MG 1.6   PHOS 2.3*   ALT 14     No results found for: \"GLUCPOC\"  No results for input(s): \"PH\", \"PCO2\", \"PO2\", \"HCO3\", \"FIO2\" in the last 72 hours.  No results for input(s): \"INR\" in the last 72 hours.    Radiology and EKG reviewed:   chest CT reviewed    **Prior records, notes, labs, radiology, and medications reviewed in [facility]**       Assessment/Plan:       Principal Problem:     76 yo hx of HTN, DM, presented w/ cough, hypoxia, chest pain, found to have a RLL pneumonia, GARRISON    1) RLL Pneumonia: likely CAP.  Will check mycoplasma, legionella, viral panel.  Empirically start on IV CTX/azithro    2) Acute resp failure/hypoxia: O2 sat was 87% on RA on admission.  Likely from pneumonia.  Chest CT neg for PE.  Will cont O2, wean as tolerated    3) GARRISON: likely due to dehydration.  Will hold HCTZ, ACEi.  Check renal U/S.  Cont IVF, monitor BMP    4) Pleuritic chest pain: due to pneumonia.  Use IV dilaudid prn severe pain    5) HTN: BP stable.  Cont norvasc, toprol.  Hold HCTZ/lisinopril    6) DM type 2: check A1C.  Hold metformin.  Start SSI    7) Adrenal nodules: incidental findings.  Will need MRI at a later date to re-eval    Risk of deterioration: high      Total time spent with patient care: 70 Minutes **I personally saw and examined the patient during this time period**                 Care Plan discussed with: Patient, nursing     Discussed:  Care Plan    Prophylaxis:  Lovenox    Probable Disposition:  Home  w/Family           ___________________________________________________    Attending Physician: Omid Villanueva MD

## 2025-06-09 NOTE — ED TRIAGE NOTES
Chest pain that radiates to her back since 0200. Denies SOB. Patient coughing on arrival with body aches an chills. 02 sats 88% on RA. Vargas

## 2025-06-10 LAB
ALBUMIN SERPL-MCNC: 2.9 G/DL (ref 3.5–5)
ALBUMIN/GLOB SERPL: 1 (ref 1.1–2.2)
ALP SERPL-CCNC: 66 U/L (ref 45–117)
ALT SERPL-CCNC: 13 U/L (ref 12–78)
ANION GAP SERPL CALC-SCNC: 7 MMOL/L (ref 2–12)
AST SERPL-CCNC: 15 U/L (ref 15–37)
BASOPHILS # BLD: 0.02 K/UL (ref 0–0.1)
BASOPHILS NFR BLD: 0.1 % (ref 0–1)
BILIRUB SERPL-MCNC: 0.7 MG/DL (ref 0.2–1)
BUN SERPL-MCNC: 38 MG/DL (ref 6–20)
BUN/CREAT SERPL: 22 (ref 12–20)
CALCIUM SERPL-MCNC: 9 MG/DL (ref 8.5–10.1)
CHLORIDE SERPL-SCNC: 110 MMOL/L (ref 97–108)
CO2 SERPL-SCNC: 24 MMOL/L (ref 21–32)
CREAT SERPL-MCNC: 1.7 MG/DL (ref 0.55–1.02)
CRP SERPL-MCNC: 16.2 MG/DL (ref 0–0.3)
DIFFERENTIAL METHOD BLD: ABNORMAL
EOSINOPHIL # BLD: 0 K/UL (ref 0–0.4)
EOSINOPHIL NFR BLD: 0 % (ref 0–7)
ERYTHROCYTE [DISTWIDTH] IN BLOOD BY AUTOMATED COUNT: 14.7 % (ref 11.5–14.5)
EST. AVERAGE GLUCOSE BLD GHB EST-MCNC: 123 MG/DL
GLOBULIN SER CALC-MCNC: 2.8 G/DL (ref 2–4)
GLUCOSE BLD STRIP.AUTO-MCNC: 120 MG/DL (ref 65–117)
GLUCOSE BLD STRIP.AUTO-MCNC: 143 MG/DL (ref 65–117)
GLUCOSE BLD STRIP.AUTO-MCNC: 143 MG/DL (ref 65–117)
GLUCOSE BLD STRIP.AUTO-MCNC: 173 MG/DL (ref 65–117)
GLUCOSE SERPL-MCNC: 146 MG/DL (ref 65–100)
HBA1C MFR BLD: 5.9 % (ref 4–5.6)
HCT VFR BLD AUTO: 34.6 % (ref 35–47)
HGB BLD-MCNC: 11.1 G/DL (ref 11.5–16)
IMM GRANULOCYTES # BLD AUTO: 0.11 K/UL (ref 0–0.04)
IMM GRANULOCYTES NFR BLD AUTO: 0.7 % (ref 0–0.5)
LYMPHOCYTES # BLD: 0.92 K/UL (ref 0.8–3.5)
LYMPHOCYTES NFR BLD: 6.1 % (ref 12–49)
MAGNESIUM SERPL-MCNC: 1.8 MG/DL (ref 1.6–2.4)
MCH RBC QN AUTO: 26.4 PG (ref 26–34)
MCHC RBC AUTO-ENTMCNC: 32.1 G/DL (ref 30–36.5)
MCV RBC AUTO: 82.4 FL (ref 80–99)
MONOCYTES # BLD: 0.23 K/UL (ref 0–1)
MONOCYTES NFR BLD: 1.5 % (ref 5–13)
NEUTS SEG # BLD: 13.83 K/UL (ref 1.8–8)
NEUTS SEG NFR BLD: 91.6 % (ref 32–75)
NRBC # BLD: 0 K/UL (ref 0–0.01)
NRBC BLD-RTO: 0 PER 100 WBC
PHOSPHATE SERPL-MCNC: 2.2 MG/DL (ref 2.6–4.7)
PLATELET # BLD AUTO: 251 K/UL (ref 150–400)
PMV BLD AUTO: 11.2 FL (ref 8.9–12.9)
POTASSIUM SERPL-SCNC: 4.7 MMOL/L (ref 3.5–5.1)
PROCALCITONIN SERPL-MCNC: 1.62 NG/ML
PROT SERPL-MCNC: 5.7 G/DL (ref 6.4–8.2)
RBC # BLD AUTO: 4.2 M/UL (ref 3.8–5.2)
RBC MORPH BLD: ABNORMAL
SERVICE CMNT-IMP: ABNORMAL
SODIUM SERPL-SCNC: 141 MMOL/L (ref 136–145)
WBC # BLD AUTO: 15.1 K/UL (ref 3.6–11)

## 2025-06-10 PROCEDURE — 94640 AIRWAY INHALATION TREATMENT: CPT

## 2025-06-10 PROCEDURE — 6370000000 HC RX 637 (ALT 250 FOR IP): Performed by: STUDENT IN AN ORGANIZED HEALTH CARE EDUCATION/TRAINING PROGRAM

## 2025-06-10 PROCEDURE — 84145 PROCALCITONIN (PCT): CPT

## 2025-06-10 PROCEDURE — 83036 HEMOGLOBIN GLYCOSYLATED A1C: CPT

## 2025-06-10 PROCEDURE — 85025 COMPLETE CBC W/AUTO DIFF WBC: CPT

## 2025-06-10 PROCEDURE — 1100000000 HC RM PRIVATE

## 2025-06-10 PROCEDURE — 2500000003 HC RX 250 WO HCPCS: Performed by: INTERNAL MEDICINE

## 2025-06-10 PROCEDURE — 86140 C-REACTIVE PROTEIN: CPT

## 2025-06-10 PROCEDURE — 6370000000 HC RX 637 (ALT 250 FOR IP): Performed by: INTERNAL MEDICINE

## 2025-06-10 PROCEDURE — 84100 ASSAY OF PHOSPHORUS: CPT

## 2025-06-10 PROCEDURE — 83735 ASSAY OF MAGNESIUM: CPT

## 2025-06-10 PROCEDURE — 94761 N-INVAS EAR/PLS OXIMETRY MLT: CPT

## 2025-06-10 PROCEDURE — 80053 COMPREHEN METABOLIC PANEL: CPT

## 2025-06-10 PROCEDURE — 6360000002 HC RX W HCPCS: Performed by: INTERNAL MEDICINE

## 2025-06-10 PROCEDURE — 82962 GLUCOSE BLOOD TEST: CPT

## 2025-06-10 PROCEDURE — 2580000003 HC RX 258: Performed by: INTERNAL MEDICINE

## 2025-06-10 PROCEDURE — 36415 COLL VENOUS BLD VENIPUNCTURE: CPT

## 2025-06-10 RX ORDER — DEXTROSE MONOHYDRATE 100 MG/ML
INJECTION, SOLUTION INTRAVENOUS CONTINUOUS PRN
Status: DISCONTINUED | OUTPATIENT
Start: 2025-06-10 | End: 2025-06-12 | Stop reason: HOSPADM

## 2025-06-10 RX ORDER — IPRATROPIUM BROMIDE AND ALBUTEROL SULFATE 2.5; .5 MG/3ML; MG/3ML
1 SOLUTION RESPIRATORY (INHALATION)
Status: DISCONTINUED | OUTPATIENT
Start: 2025-06-10 | End: 2025-06-11

## 2025-06-10 RX ORDER — INSULIN GLARGINE 100 [IU]/ML
10 INJECTION, SOLUTION SUBCUTANEOUS NIGHTLY
Status: DISCONTINUED | OUTPATIENT
Start: 2025-06-10 | End: 2025-06-12 | Stop reason: HOSPADM

## 2025-06-10 RX ADMIN — AMLODIPINE BESYLATE 5 MG: 5 TABLET ORAL at 08:19

## 2025-06-10 RX ADMIN — AZITHROMYCIN MONOHYDRATE 500 MG: 500 INJECTION, POWDER, LYOPHILIZED, FOR SOLUTION INTRAVENOUS at 16:54

## 2025-06-10 RX ADMIN — SODIUM CHLORIDE: 0.9 INJECTION, SOLUTION INTRAVENOUS at 20:53

## 2025-06-10 RX ADMIN — ASPIRIN 81 MG: 81 TABLET, COATED ORAL at 08:19

## 2025-06-10 RX ADMIN — GUAIFENESIN 600 MG: 600 TABLET, EXTENDED RELEASE ORAL at 20:51

## 2025-06-10 RX ADMIN — IPRATROPIUM BROMIDE AND ALBUTEROL SULFATE 1 DOSE: .5; 3 SOLUTION RESPIRATORY (INHALATION) at 19:51

## 2025-06-10 RX ADMIN — WATER 2000 MG: 1 INJECTION INTRAMUSCULAR; INTRAVENOUS; SUBCUTANEOUS at 13:53

## 2025-06-10 RX ADMIN — PANTOPRAZOLE SODIUM 40 MG: 40 TABLET, DELAYED RELEASE ORAL at 06:54

## 2025-06-10 RX ADMIN — FAMOTIDINE 20 MG: 20 TABLET, FILM COATED ORAL at 20:51

## 2025-06-10 RX ADMIN — IPRATROPIUM BROMIDE AND ALBUTEROL SULFATE 1 DOSE: .5; 3 SOLUTION RESPIRATORY (INHALATION) at 13:20

## 2025-06-10 RX ADMIN — SODIUM CHLORIDE, PRESERVATIVE FREE 10 ML: 5 INJECTION INTRAVENOUS at 08:22

## 2025-06-10 RX ADMIN — DOCUSATE SODIUM 50MG AND SENNOSIDES 8.6MG 1 TABLET: 8.6; 5 TABLET, FILM COATED ORAL at 08:19

## 2025-06-10 RX ADMIN — GUAIFENESIN 600 MG: 600 TABLET, EXTENDED RELEASE ORAL at 08:19

## 2025-06-10 RX ADMIN — INSULIN GLARGINE 10 UNITS: 100 INJECTION, SOLUTION SUBCUTANEOUS at 20:51

## 2025-06-10 RX ADMIN — METOPROLOL SUCCINATE 25 MG: 25 TABLET, EXTENDED RELEASE ORAL at 08:19

## 2025-06-10 RX ADMIN — ENOXAPARIN SODIUM 30 MG: 100 INJECTION SUBCUTANEOUS at 08:19

## 2025-06-10 RX ADMIN — DOCUSATE SODIUM 50MG AND SENNOSIDES 8.6MG 1 TABLET: 8.6; 5 TABLET, FILM COATED ORAL at 20:51

## 2025-06-10 NOTE — PLAN OF CARE
Problem: Respiratory - Adult  Goal: Achieves optimal ventilation and oxygenation  6/10/2025 1954 by Barbara Levy RCP  Outcome: Progressing  6/10/2025 1345 by Colin Venegas, RT  Outcome: Progressing

## 2025-06-10 NOTE — PROGRESS NOTES
Hospitalist Progress Note      NAME:  Alejandro Lazar   :  1950  MRM:  [id number]    Date/Time: 6/10/2025  9:50 AM           Assessment / Plan:     76 yo hx of HTN, DM, presented w/ cough, hypoxia, chest pain, found to have a RLL pneumonia, GARRISON     1) RLL Pneumonia: likely CAP.  Viral panel negative.  Mycoplasma, legionella pending.  Continue IV ceftriaxone/azithromycin.  Follow sputum cultures.     2) Acute resp failure/hypoxia: O2 sat was 87% on RA on admission.  Likely from pneumonia.  Chest CT neg for PE.  Will cont O2, wean as tolerated     3) GARRISON: likely due to dehydration.  Will hold HCTZ, ACEi.  Renal ultrasound unremarkable.  Cont IVF, monitor BMP     4) Pleuritic chest pain: due to pneumonia.  Use IV dilaudid prn severe pain     5) HTN: BP stable.  Cont norvasc, toprol.  Hold HCTZ/lisinopril     6) DM type 2: check A1C.  Hold metformin.  Start SSI.  Add Lantus 10 units daily.     7) Adrenal nodules: Known findings and it has been followed as an outpatient.       Risk of deterioration: high          #BMI (Calculated): 31    I have personally reviewed the radiographs, laboratory data in Epic and decisions and statements above are based partially on this personal interpretation.                 Care Plan discussed with: Patient, Care Manager, and Nursing Staff    Discussed:  Care Plan    Prophylaxis:  Lovenox    Disposition:  Home w/Family           ___________________________________________________    Attending Physician: Vince Mckay MD        Subjective:     Chief Complaint: Cough, chest pain  Still feeling weak, coughing and generalized bodyaches.  Slightly better today.          Objective:       Vitals:          Last 24hrs VS reviewed since prior progress note. Most recent are:    Vitals:    06/10/25 0742   BP: (!) 139/52   Pulse: 60   Resp: 16   Temp: 97.9 °F (36.6 °C)   SpO2: 97%     SpO2 Readings from Last 6 Encounters:   06/10/25 97%   25 98%   24 98%   24 99%   23  98%   06/26/23 99%          Intake/Output Summary (Last 24 hours) at 6/10/2025 0950  Last data filed at 6/9/2025 2231  Gross per 24 hour   Intake 1410.18 ml   Output --   Net 1410.18 ml          Exam:     Physical Exam:    Gen:  Well-developed, well-nourished, in no acute distress  HEENT:  Pink conjunctivae, PERRL, hearing intact to voice, moist mucous membranes  Neck:  Supple, without masses, thyroid non-tender  Resp:  No accessory muscle use, some rhonchi at bases  Card:  No murmurs, normal S1, S2 without thrills, bruits or peripheral edema  Abd:  Soft, non-tender, non-distended, normoactive bowel sounds are present  Lymph:  No cervical adenopathy  Musc:  No cyanosis or clubbing  Skin:  No rashes  Neuro:  Cranial nerves 3-12 are grossly intact, follows commands appropriately  Psych:  Alert with good insight.  Oriented to person, place, and time       Telemetry reviewed:   normal sinus rhythm    Medications Reviewed: (see below)    Lab Data Reviewed: (see below)    ______________________________________________________________________    Medications:     Current Facility-Administered Medications   Medication Dose Route Frequency    ipratropium 0.5 mg-albuterol 2.5 mg (DUONEB) nebulizer solution 1 Dose  1 Dose Inhalation Q4H WA RT    insulin glargine (LANTUS) injection vial 10 Units  10 Units SubCUTAneous Nightly    glucose chewable tablet 16 g  4 tablet Oral PRN    dextrose bolus 10% 125 mL  125 mL IntraVENous PRN    Or    dextrose bolus 10% 250 mL  250 mL IntraVENous PRN    glucagon injection 1 mg  1 mg SubCUTAneous PRN    dextrose 10 % infusion   IntraVENous Continuous PRN    albuterol 5mg / ipratropium 0.5mg neb solution  1 Dose Nebulization Once    0.9 % sodium chloride infusion   IntraVENous Continuous    sodium chloride flush 0.9 % injection 5-40 mL  5-40 mL IntraVENous 2 times per day    sodium chloride flush 0.9 % injection 5-40 mL  5-40 mL IntraVENous PRN    0.9 % sodium chloride infusion   IntraVENous PRN     enoxaparin Sodium (LOVENOX) injection 30 mg  30 mg SubCUTAneous Daily    ondansetron (ZOFRAN) injection 4 mg  4 mg IntraVENous Q6H PRN    sennosides-docusate sodium (SENOKOT-S) 8.6-50 MG tablet 1 tablet  1 tablet Oral BID    polyethylene glycol (GLYCOLAX) packet 17 g  17 g Oral Daily PRN    acetaminophen (TYLENOL) tablet 650 mg  650 mg Oral Q6H PRN    Or    acetaminophen (TYLENOL) suppository 650 mg  650 mg Rectal Q6H PRN    guaiFENesin (MUCINEX) extended release tablet 600 mg  600 mg Oral BID    benzonatate (TESSALON) capsule 100 mg  100 mg Oral TID PRN    ipratropium 0.5 mg-albuterol 2.5 mg (DUONEB) nebulizer solution 1 Dose  1 Dose Inhalation Q4H PRN    cefTRIAXone (ROCEPHIN) 2,000 mg in sterile water 20 mL IV syringe  2,000 mg IntraVENous Q24H    azithromycin (ZITHROMAX) 500 mg in sodium chloride 0.9 % 250 mL IVPB ([id number])  500 mg IntraVENous Q24H    insulin lispro (HUMALOG,ADMELOG) injection vial 0-8 Units  0-8 Units SubCUTAneous 4x Daily AC & HS    aspirin EC tablet 81 mg  81 mg Oral Daily    amLODIPine (NORVASC) tablet 5 mg  5 mg Oral Daily    famotidine (PEPCID) tablet 20 mg  20 mg Oral Nightly    pantoprazole (PROTONIX) tablet 40 mg  40 mg Oral QAM AC    metoprolol succinate (TOPROL XL) extended release tablet 25 mg  25 mg Oral Daily    HYDROmorphone (DILAUDID) injection 0.5 mg  0.5 mg IntraVENous Q4H PRN    oxyCODONE (ROXICODONE) immediate release tablet 5 mg  5 mg Oral Q4H PRN            Lab Review:     Recent Labs     06/09/25  0552 06/10/25  0432   WBC 15.2* 15.1*   HGB 12.6 11.1*   HCT 38.6 34.6*    251     Recent Labs     06/09/25  0552 06/10/25  0432    141   K 3.8 4.7    110*   CO2 29 24   BUN 36* 38*   MG 1.6 1.8   PHOS 2.3* 2.2*   ALT 14 13     No components found for: \"GLPOC\"

## 2025-06-11 LAB
ANION GAP SERPL CALC-SCNC: 6 MMOL/L (ref 2–12)
BACTERIA SPEC CULT: NORMAL
BASOPHILS # BLD: 0.02 K/UL (ref 0–0.1)
BASOPHILS NFR BLD: 0.2 % (ref 0–1)
BUN SERPL-MCNC: 31 MG/DL (ref 6–20)
BUN/CREAT SERPL: 23 (ref 12–20)
CALCIUM SERPL-MCNC: 8.9 MG/DL (ref 8.5–10.1)
CHLORIDE SERPL-SCNC: 114 MMOL/L (ref 97–108)
CO2 SERPL-SCNC: 24 MMOL/L (ref 21–32)
CREAT SERPL-MCNC: 1.36 MG/DL (ref 0.55–1.02)
DIFFERENTIAL METHOD BLD: ABNORMAL
EOSINOPHIL # BLD: 0.02 K/UL (ref 0–0.4)
EOSINOPHIL NFR BLD: 0.2 % (ref 0–7)
ERYTHROCYTE [DISTWIDTH] IN BLOOD BY AUTOMATED COUNT: 14.8 % (ref 11.5–14.5)
GLUCOSE BLD STRIP.AUTO-MCNC: 104 MG/DL (ref 65–117)
GLUCOSE BLD STRIP.AUTO-MCNC: 117 MG/DL (ref 65–117)
GLUCOSE BLD STRIP.AUTO-MCNC: 128 MG/DL (ref 65–117)
GLUCOSE BLD STRIP.AUTO-MCNC: 226 MG/DL (ref 65–117)
GLUCOSE BLD STRIP.AUTO-MCNC: 260 MG/DL (ref 65–117)
GLUCOSE SERPL-MCNC: 121 MG/DL (ref 65–100)
GRAM STN SPEC: NORMAL
HCT VFR BLD AUTO: 31.1 % (ref 35–47)
HGB BLD-MCNC: 10 G/DL (ref 11.5–16)
IMM GRANULOCYTES # BLD AUTO: 0.07 K/UL (ref 0–0.04)
IMM GRANULOCYTES NFR BLD AUTO: 0.6 % (ref 0–0.5)
L PNEUMO1 AG UR QL IA: NEGATIVE
LYMPHOCYTES # BLD: 2.11 K/UL (ref 0.8–3.5)
LYMPHOCYTES NFR BLD: 17 % (ref 12–49)
MCH RBC QN AUTO: 26.6 PG (ref 26–34)
MCHC RBC AUTO-ENTMCNC: 32.2 G/DL (ref 30–36.5)
MCV RBC AUTO: 82.7 FL (ref 80–99)
MONOCYTES # BLD: 0.68 K/UL (ref 0–1)
MONOCYTES NFR BLD: 5.5 % (ref 5–13)
NEUTS SEG # BLD: 9.54 K/UL (ref 1.8–8)
NEUTS SEG NFR BLD: 76.5 % (ref 32–75)
NRBC # BLD: 0 K/UL (ref 0–0.01)
NRBC BLD-RTO: 0 PER 100 WBC
PLATELET # BLD AUTO: 235 K/UL (ref 150–400)
PMV BLD AUTO: 10.8 FL (ref 8.9–12.9)
POTASSIUM SERPL-SCNC: 4.3 MMOL/L (ref 3.5–5.1)
RBC # BLD AUTO: 3.76 M/UL (ref 3.8–5.2)
SERVICE CMNT-IMP: ABNORMAL
SERVICE CMNT-IMP: NORMAL
SODIUM SERPL-SCNC: 144 MMOL/L (ref 136–145)
SPECIMEN SOURCE: NORMAL
WBC # BLD AUTO: 12.4 K/UL (ref 3.6–11)

## 2025-06-11 PROCEDURE — 94761 N-INVAS EAR/PLS OXIMETRY MLT: CPT

## 2025-06-11 PROCEDURE — 6370000000 HC RX 637 (ALT 250 FOR IP): Performed by: INTERNAL MEDICINE

## 2025-06-11 PROCEDURE — 6370000000 HC RX 637 (ALT 250 FOR IP): Performed by: STUDENT IN AN ORGANIZED HEALTH CARE EDUCATION/TRAINING PROGRAM

## 2025-06-11 PROCEDURE — 80048 BASIC METABOLIC PNL TOTAL CA: CPT

## 2025-06-11 PROCEDURE — 85025 COMPLETE CBC W/AUTO DIFF WBC: CPT

## 2025-06-11 PROCEDURE — 82962 GLUCOSE BLOOD TEST: CPT

## 2025-06-11 PROCEDURE — 2500000003 HC RX 250 WO HCPCS: Performed by: INTERNAL MEDICINE

## 2025-06-11 PROCEDURE — 1100000000 HC RM PRIVATE

## 2025-06-11 PROCEDURE — 6370000000 HC RX 637 (ALT 250 FOR IP): Performed by: NURSE PRACTITIONER

## 2025-06-11 PROCEDURE — 6360000002 HC RX W HCPCS: Performed by: INTERNAL MEDICINE

## 2025-06-11 PROCEDURE — 94640 AIRWAY INHALATION TREATMENT: CPT

## 2025-06-11 RX ORDER — PREDNISONE 20 MG/1
40 TABLET ORAL DAILY
Status: DISCONTINUED | OUTPATIENT
Start: 2025-06-11 | End: 2025-06-12 | Stop reason: HOSPADM

## 2025-06-11 RX ORDER — AZITHROMYCIN 250 MG/1
500 TABLET, FILM COATED ORAL DAILY
Status: DISCONTINUED | OUTPATIENT
Start: 2025-06-11 | End: 2025-06-12 | Stop reason: HOSPADM

## 2025-06-11 RX ORDER — IPRATROPIUM BROMIDE AND ALBUTEROL SULFATE 2.5; .5 MG/3ML; MG/3ML
1 SOLUTION RESPIRATORY (INHALATION)
Status: DISCONTINUED | OUTPATIENT
Start: 2025-06-11 | End: 2025-06-12 | Stop reason: HOSPADM

## 2025-06-11 RX ORDER — SODIUM CHLORIDE 9 MG/ML
INJECTION, SOLUTION INTRAVENOUS CONTINUOUS
Status: DISPENSED | OUTPATIENT
Start: 2025-06-11 | End: 2025-06-11

## 2025-06-11 RX ADMIN — IPRATROPIUM BROMIDE AND ALBUTEROL SULFATE 1 DOSE: .5; 3 SOLUTION RESPIRATORY (INHALATION) at 13:33

## 2025-06-11 RX ADMIN — ASPIRIN 81 MG: 81 TABLET, COATED ORAL at 08:55

## 2025-06-11 RX ADMIN — DOCUSATE SODIUM 50MG AND SENNOSIDES 8.6MG 1 TABLET: 8.6; 5 TABLET, FILM COATED ORAL at 08:55

## 2025-06-11 RX ADMIN — METOPROLOL SUCCINATE 25 MG: 25 TABLET, EXTENDED RELEASE ORAL at 08:55

## 2025-06-11 RX ADMIN — BENZONATATE 100 MG: 100 CAPSULE ORAL at 17:06

## 2025-06-11 RX ADMIN — GUAIFENESIN 600 MG: 600 TABLET, EXTENDED RELEASE ORAL at 08:55

## 2025-06-11 RX ADMIN — BENZONATATE 100 MG: 100 CAPSULE ORAL at 05:48

## 2025-06-11 RX ADMIN — IPRATROPIUM BROMIDE AND ALBUTEROL SULFATE 1 DOSE: .5; 3 SOLUTION RESPIRATORY (INHALATION) at 07:30

## 2025-06-11 RX ADMIN — ENOXAPARIN SODIUM 30 MG: 100 INJECTION SUBCUTANEOUS at 08:55

## 2025-06-11 RX ADMIN — INSULIN LISPRO 2 UNITS: 100 INJECTION, SOLUTION INTRAVENOUS; SUBCUTANEOUS at 20:52

## 2025-06-11 RX ADMIN — IPRATROPIUM BROMIDE AND ALBUTEROL SULFATE 1 DOSE: .5; 3 SOLUTION RESPIRATORY (INHALATION) at 06:00

## 2025-06-11 RX ADMIN — PANTOPRAZOLE SODIUM 40 MG: 40 TABLET, DELAYED RELEASE ORAL at 05:48

## 2025-06-11 RX ADMIN — INSULIN GLARGINE 10 UNITS: 100 INJECTION, SOLUTION SUBCUTANEOUS at 20:47

## 2025-06-11 RX ADMIN — SODIUM CHLORIDE, PRESERVATIVE FREE 10 ML: 5 INJECTION INTRAVENOUS at 20:49

## 2025-06-11 RX ADMIN — FAMOTIDINE 20 MG: 20 TABLET, FILM COATED ORAL at 20:46

## 2025-06-11 RX ADMIN — PREDNISONE 40 MG: 20 TABLET ORAL at 14:18

## 2025-06-11 RX ADMIN — WATER 2000 MG: 1 INJECTION INTRAMUSCULAR; INTRAVENOUS; SUBCUTANEOUS at 14:18

## 2025-06-11 RX ADMIN — GUAIFENESIN 600 MG: 600 TABLET, EXTENDED RELEASE ORAL at 20:46

## 2025-06-11 RX ADMIN — DOCUSATE SODIUM 50MG AND SENNOSIDES 8.6MG 1 TABLET: 8.6; 5 TABLET, FILM COATED ORAL at 20:46

## 2025-06-11 RX ADMIN — AMLODIPINE BESYLATE 5 MG: 5 TABLET ORAL at 08:55

## 2025-06-11 RX ADMIN — AZITHROMYCIN DIHYDRATE 500 MG: 250 TABLET ORAL at 08:55

## 2025-06-11 RX ADMIN — ONDANSETRON 4 MG: 2 INJECTION, SOLUTION INTRAMUSCULAR; INTRAVENOUS at 12:05

## 2025-06-11 RX ADMIN — IPRATROPIUM BROMIDE AND ALBUTEROL SULFATE 1 DOSE: .5; 3 SOLUTION RESPIRATORY (INHALATION) at 20:20

## 2025-06-11 NOTE — PROGRESS NOTES
Pharmacy Dosing Services: 6/11/25    The pharmacist has determined that this patient meets P & T approved criteria for conversion from IV to oral therapy for the following medication: azithromycin      The pharmacist has written the following order for the patient: azithromycin 500mg PO daily x 3 days to complete 5 day course    The pharmacist will continue to monitor the patient's status and advise the physician if conversion back to IV therapy is recommended.    Signed ROMMEL LOZANO RPH Contact information: [phone]

## 2025-06-11 NOTE — PROGRESS NOTES
Spiritual Care Partner Volunteer visited patient at [facility] in [facility] MULTI-SPECIALTY ONCOLOGY 1 on 06/10/25    Documented by:  Chaplain Niru Mejias MS, MDiv, [facility]  Spiritual Health Services  Paging service: [phone] (RUPESH)

## 2025-06-11 NOTE — PLAN OF CARE
Problem: Chronic Conditions and Co-morbidities  Goal: Patient's chronic conditions and co-morbidity symptoms are monitored and maintained or improved  Outcome: Progressing     Problem: Safety - Adult  Goal: Free from fall injury  Outcome: Progressing     Problem: Respiratory - Adult  Goal: Achieves optimal ventilation and oxygenation  6/11/2025 1958 by Kelin Garrett RN  Outcome: Progressing  6/11/2025 0913 by Colin Venegas, RT  Outcome: Progressing

## 2025-06-11 NOTE — CONSULTS
Name: Alejandro Lazar Hospital: [facility]   : 1950 Admit Date: 2025   Phone: [phone]  Room: [facility]/01   PCP: Petra Montoya DO  MRN: [id number]   Date: 2025  Code: Full Code        HPI:      Chart and notes reviewed. Data reviewed. I review the patient's current medications in the medical record at each encounter.  I have evaluated and examined the patient.     1:25 PM       History was obtained from patient.     I was asked by Omid PUENTES Do, MD to see Alejandro Lazar in consultation for a chief complaint of pneumonia.    History of Present Illness:   is a 76 yo ladywith a PMH of arthritis, DM, GERD, hyperlipidemia, and hypertension that presented to [facility] on  with worsening shortness of breath and productive cough.   CT demonstrated a RLL pneumonia and she was admitted for management.  She notes that she did not feel better last night and had increased wheezing and nausea.  Today, she is feeling better. Denies sputum production. Denies fever/chills.  Denies history of pneumonia.  She denies history of chronic lung disease and does not use inhalers or oxygen at home. She has a remote smokign history.    Images:  Personally reviewed.    Chest CTA :  no PE. Right lower lobe pleural based pneumonia more likely than atx.  Bilateral adrenal nodules.     Sats 97% on RA  Afebrile  WBC 12.4--15.2 on admission  Hgb 10--12.6 on admission  EOS 20  Sputum culture with light NRF  RVP negative  Blood cultures no growth x 2 days  Procalcitonin 1.62  Pro-  HS trop 4    Past Medical History:   Diagnosis Date    Arthritis     Diabetes (HCC)     GERD (gastroesophageal reflux disease)     Hyperlipemia     Hypertension        Past Surgical History:   Procedure Laterality Date    CARPAL TUNNEL RELEASE      COLONOSCOPY N/A 10/18/2022    COLONOSCOPY performed by Nicanor Barajas MD at [facility] ENDOSCOPY    HEENT      tonsilectomy    HYSTERECTOMY (CERVIX STATUS UNKNOWN)      KNEE  4.3 06/11/2025 02:08 AM     06/11/2025 02:08 AM    CO2 24 06/11/2025 02:08 AM    BUN 31 06/11/2025 02:08 AM    MG 1.8 06/10/2025 04:32 AM    PHOS 2.2 06/10/2025 04:32 AM       Lab Results   Component Value Date/Time    WBC 12.4 06/11/2025 02:08 AM    HGB 10.0 06/11/2025 02:08 AM     06/11/2025 02:08 AM    MCV 82.7 06/11/2025 02:08 AM       Lab Results   Component Value Date/Time    GLOB 2.8 06/10/2025 04:32 AM       No results found for: \"IRON\", \"TIBC\"    Lab Results   Component Value Date/Time    CRP 16.20 06/10/2025 04:32 AM        @[id number](ph,phi,pco2,pco2i,po2,po2i,hco3,hco3i,fio2,fio2i)@    No results found for: \"CPK\", \"BNP\"     No components found for: \"CULT\"    No results found for: \"CMV\", \"RPR\", \"HAAB\", \"HCAB1\", \"G6PD\", \"HIVR\", \"HIV1\", \"HIV12\", \"HIVPC\", \"HIVRPI\"    No results found for: \"CPK\"    No results found for: \"COLOR\", \"CASTS\"    IMPRESSION  Pneumonia  Wheezing; likely reactive from pneumonia    PLAN  Supplemental O2 to keep sats >90%; currently on RA  Agree with current abx, would complete a 7 day course  Follow cultures  Agree with scheduled Duo-Nebs.  Add prednisone.  Continue Mucinex  Encourage IS use  OOB  Lovenox for DVT prophylaxis      Thank you very much for the consult.      EMELIA Raza - NP

## 2025-06-11 NOTE — PROGRESS NOTES
Hospitalist Progress Note      NAME:  Alejandro Lazar   :  1950  MRM:  [id number]    Date/Time: 2025  11:36 AM           Assessment / Plan:     76 yo hx of HTN, DM, presented w/ cough, hypoxia, chest pain, found to have a RLL pneumonia, GARRISON     1) RLL Pneumonia: likely CAP.  Viral panel negative.  Mycoplasma, legionella pending.  Continue to have cough, wheezing, pleuritic pain.  No fever.  Sputum culture growing normal chyna.  Blood cultures negative so far.  Will consult pulmonology.  Continue current IV antibiotics ceftriaxone/azithromycin.  Monitor breathing status.  O2 saturation closely.     2) Acute resp failure/hypoxia: O2 sat was 87% on RA on admission.  Likely from pneumonia.  Chest CT neg for PE.  Added DuoNebs.  Will cont O2, wean as tolerated     3) GARRISON: likely due to dehydration.  Will hold HCTZ, ACEi.  Renal ultrasound unremarkable.  Cont IVF, monitor BMP     4) Pleuritic chest pain: due to pneumonia.  Use IV dilaudid prn severe pain     5) HTN: BP stable.  Cont norvasc, toprol.  Hold HCTZ/lisinopril     6) DM type 2: check A1C.  Hold metformin.  Start SSI.  Add Lantus 10 units daily.     7) Adrenal nodules: Known findings and it has been followed as an outpatient.       Risk of deterioration: high          #BMI (Calculated): 31    I have personally reviewed the radiographs, laboratory data in Epic and decisions and statements above are based partially on this personal interpretation.                 Care Plan discussed with: Patient, Care Manager, and Nursing Staff    Discussed:  Care Plan    Prophylaxis:  Lovenox    Disposition:  Home w/Family           ___________________________________________________    Attending Physician: Vince Mckay MD        Subjective:     Chief Complaint: Cough, chest pain  Still coughing, weak and tired.  Started wheezing.          Objective:       Vitals:          Last 24hrs VS reviewed since prior progress note. Most recent are:    Vitals:    25 0731

## 2025-06-12 VITALS
BODY MASS INDEX: 31.16 KG/M2 | OXYGEN SATURATION: 96 % | WEIGHT: 158.73 LBS | TEMPERATURE: 98.2 F | SYSTOLIC BLOOD PRESSURE: 131 MMHG | HEIGHT: 60 IN | HEART RATE: 62 BPM | RESPIRATION RATE: 18 BRPM | DIASTOLIC BLOOD PRESSURE: 73 MMHG

## 2025-06-12 LAB
ANION GAP SERPL CALC-SCNC: 6 MMOL/L (ref 2–12)
BASOPHILS # BLD: 0.02 K/UL (ref 0–0.1)
BASOPHILS NFR BLD: 0.2 % (ref 0–1)
BUN SERPL-MCNC: 20 MG/DL (ref 6–20)
BUN/CREAT SERPL: 17 (ref 12–20)
CALCIUM SERPL-MCNC: 8.6 MG/DL (ref 8.5–10.1)
CHLORIDE SERPL-SCNC: 113 MMOL/L (ref 97–108)
CO2 SERPL-SCNC: 24 MMOL/L (ref 21–32)
CREAT SERPL-MCNC: 1.21 MG/DL (ref 0.55–1.02)
DIFFERENTIAL METHOD BLD: ABNORMAL
EOSINOPHIL # BLD: 0 K/UL (ref 0–0.4)
EOSINOPHIL NFR BLD: 0 % (ref 0–7)
ERYTHROCYTE [DISTWIDTH] IN BLOOD BY AUTOMATED COUNT: 14.8 % (ref 11.5–14.5)
GLUCOSE BLD STRIP.AUTO-MCNC: 103 MG/DL (ref 65–117)
GLUCOSE BLD STRIP.AUTO-MCNC: 105 MG/DL (ref 65–117)
GLUCOSE SERPL-MCNC: 130 MG/DL (ref 65–100)
HCT VFR BLD AUTO: 33.7 % (ref 35–47)
HGB BLD-MCNC: 10.8 G/DL (ref 11.5–16)
IMM GRANULOCYTES # BLD AUTO: 0.12 K/UL (ref 0–0.04)
IMM GRANULOCYTES NFR BLD AUTO: 1.2 % (ref 0–0.5)
LYMPHOCYTES # BLD: 1.28 K/UL (ref 0.8–3.5)
LYMPHOCYTES NFR BLD: 12.8 % (ref 12–49)
MCH RBC QN AUTO: 26.6 PG (ref 26–34)
MCHC RBC AUTO-ENTMCNC: 32 G/DL (ref 30–36.5)
MCV RBC AUTO: 83 FL (ref 80–99)
MONOCYTES # BLD: 0.54 K/UL (ref 0–1)
MONOCYTES NFR BLD: 5.4 % (ref 5–13)
NEUTS SEG # BLD: 8.05 K/UL (ref 1.8–8)
NEUTS SEG NFR BLD: 80.4 % (ref 32–75)
NRBC # BLD: 0 K/UL (ref 0–0.01)
NRBC BLD-RTO: 0 PER 100 WBC
PLATELET # BLD AUTO: 260 K/UL (ref 150–400)
PMV BLD AUTO: 10.7 FL (ref 8.9–12.9)
POTASSIUM SERPL-SCNC: 4.3 MMOL/L (ref 3.5–5.1)
RBC # BLD AUTO: 4.06 M/UL (ref 3.8–5.2)
SERVICE CMNT-IMP: NORMAL
SERVICE CMNT-IMP: NORMAL
SODIUM SERPL-SCNC: 143 MMOL/L (ref 136–145)
WBC # BLD AUTO: 10 K/UL (ref 3.6–11)

## 2025-06-12 PROCEDURE — 80048 BASIC METABOLIC PNL TOTAL CA: CPT

## 2025-06-12 PROCEDURE — 6360000002 HC RX W HCPCS: Performed by: INTERNAL MEDICINE

## 2025-06-12 PROCEDURE — 6370000000 HC RX 637 (ALT 250 FOR IP): Performed by: STUDENT IN AN ORGANIZED HEALTH CARE EDUCATION/TRAINING PROGRAM

## 2025-06-12 PROCEDURE — 82962 GLUCOSE BLOOD TEST: CPT

## 2025-06-12 PROCEDURE — 94761 N-INVAS EAR/PLS OXIMETRY MLT: CPT

## 2025-06-12 PROCEDURE — 36415 COLL VENOUS BLD VENIPUNCTURE: CPT

## 2025-06-12 PROCEDURE — 85025 COMPLETE CBC W/AUTO DIFF WBC: CPT

## 2025-06-12 PROCEDURE — 94640 AIRWAY INHALATION TREATMENT: CPT

## 2025-06-12 PROCEDURE — 6370000000 HC RX 637 (ALT 250 FOR IP): Performed by: INTERNAL MEDICINE

## 2025-06-12 PROCEDURE — 6370000000 HC RX 637 (ALT 250 FOR IP): Performed by: NURSE PRACTITIONER

## 2025-06-12 RX ORDER — CEFDINIR 300 MG/1
300 CAPSULE ORAL 2 TIMES DAILY
Qty: 10 CAPSULE | Refills: 0 | Status: SHIPPED | OUTPATIENT
Start: 2025-06-12 | End: 2025-06-17

## 2025-06-12 RX ORDER — AZITHROMYCIN 500 MG/1
500 TABLET, FILM COATED ORAL DAILY
Qty: 3 TABLET | Refills: 0 | Status: SHIPPED | OUTPATIENT
Start: 2025-06-12 | End: 2025-06-15

## 2025-06-12 RX ORDER — BENZONATATE 100 MG/1
100 CAPSULE ORAL 3 TIMES DAILY PRN
Qty: 21 CAPSULE | Refills: 0 | Status: SHIPPED | OUTPATIENT
Start: 2025-06-12 | End: 2025-06-19

## 2025-06-12 RX ADMIN — IPRATROPIUM BROMIDE AND ALBUTEROL SULFATE 1 DOSE: .5; 3 SOLUTION RESPIRATORY (INHALATION) at 06:59

## 2025-06-12 RX ADMIN — GUAIFENESIN 600 MG: 600 TABLET, EXTENDED RELEASE ORAL at 09:39

## 2025-06-12 RX ADMIN — BENZONATATE 100 MG: 100 CAPSULE ORAL at 07:13

## 2025-06-12 RX ADMIN — ASPIRIN 81 MG: 81 TABLET, COATED ORAL at 09:39

## 2025-06-12 RX ADMIN — DOCUSATE SODIUM 50MG AND SENNOSIDES 8.6MG 1 TABLET: 8.6; 5 TABLET, FILM COATED ORAL at 09:39

## 2025-06-12 RX ADMIN — PANTOPRAZOLE SODIUM 40 MG: 40 TABLET, DELAYED RELEASE ORAL at 07:13

## 2025-06-12 RX ADMIN — AZITHROMYCIN DIHYDRATE 500 MG: 250 TABLET ORAL at 09:39

## 2025-06-12 RX ADMIN — PREDNISONE 40 MG: 20 TABLET ORAL at 09:39

## 2025-06-12 RX ADMIN — ENOXAPARIN SODIUM 30 MG: 100 INJECTION SUBCUTANEOUS at 09:39

## 2025-06-12 NOTE — DISCHARGE SUMMARY
Hospitalist Discharge Summary     Patient ID:  Alejandro Lazar  [id number]  75 y.o.  1950    Admit date: 6/9/2025    Discharge date and time: 6/12/2025    Admission Diagnoses: Pneumonia due to organism [J18.9]  GARRISON (acute kidney injury) [N17.9]  Acute respiratory failure with hypoxia (HCC) [J96.01]  Chest pain, unspecified type [R07.9]  Community acquired pneumonia of right lower lobe of lung [J18.9]    Discharge Diagnoses:    Principal Problem:    Pneumonia due to organism  Active Problems:    Acute respiratory failure with hypoxia (HCC)    GARRISON (acute kidney injury)  Resolved Problems:    * No resolved hospital problems. *         Hospital Course:     76 yo hx of HTN, DM, presented w/ cough, hypoxia, chest pain, found to have a RLL pneumonia, GARRISON     1) RLL Pneumonia: likely CAP.  Viral panel negative.  Pneumonia panel unremarkable.  Blood cultures negative so far.  Sputum culture grew normal chyna.  She is clinically improved with IV antibiotics.  Currently on room air.  Slight cough but overall better.  Discussed with pulmonology.  We will discharge her on oral antibiotics to complete 7 days course of antibiotics.  Prescription for azithromycin and Omnicef sent to the pharmacy.      2) Acute resp failure/hypoxia: O2 sat was 87% on RA on admission.  Likely from pneumonia.  Chest CT neg for PE.  Received DuoNebs and prednisone.  Hypoxia resolved and she is breathing well, maintaining O2 saturation on room air.     3) GARRISON: likely due to dehydration.  Creatinine improved with IV fluids.  We will resume her lisinopril, discussed with patient to hold HCTZ for now until follow-up with PCP.     4) Pleuritic chest pain: due to pneumonia.  Improved.     5) HTN: Cont norvasc 5 mg daily, toprol.  Okay to resume lisinopril at discharge, continue to hold HCTZ until follow-up with PCP.     6) DM type 2: A1C 5.9%.  Resume home metformin at discharge.     7) Adrenal nodules: Known findings and it has been followed as an  25 MG tablet Take 1 tablet by mouth daily      lansoprazole (PREVACID) 30 MG delayed release capsule Take by mouth every morning (before breakfast)      metFORMIN (GLUCOPHAGE) 500 MG tablet Take 1.5 tablets by mouth Daily      metoprolol succinate (TOPROL XL) 25 MG extended release tablet Take 1 tablet by mouth              Activity: activity as tolerated  Diet: diabetic diet  Wound Care: none needed    Follow-up with Petra Montoya DO in 2 weeks.  Follow-up tests/labs : None    Approximate time spent in patient care on day of discharge: 35 minutes    Signed:  Vince Mckay MD  6/12/2025  9:37 AM

## 2025-06-12 NOTE — PLAN OF CARE
Problem: Respiratory - Adult  Goal: Achieves optimal ventilation and oxygenation  6/11/2025 2024 by Barbara Levy RCP  Outcome: Progressing  6/11/2025 1958 by Kelin Garrett RN  Outcome: Progressing  6/11/2025 0913 by Colin Venegas, RT  Outcome: Progressing

## 2025-06-12 NOTE — PROGRESS NOTES
1000: Discharge instructions given. Time for questions allowed. IV removed. Brother will transport home around 1200.

## 2025-06-15 LAB
BACTERIA SPEC CULT: NORMAL
BACTERIA SPEC CULT: NORMAL
SERVICE CMNT-IMP: NORMAL
SERVICE CMNT-IMP: NORMAL

## 2025-06-16 RX ORDER — LISINOPRIL 10 MG/1
TABLET ORAL DAILY
Qty: 100 TABLET | Refills: 3 | Status: SHIPPED | OUTPATIENT
Start: 2025-06-16

## 2025-06-16 NOTE — TELEPHONE ENCOUNTER
Refill per VO of Dr. Jimmy Young:    6/5/2025    Future Appointments   Date Time Provider Department Center   6/5/2026 11:20 AM Jimmy Young MD [facility]       Requested Prescriptions     Pending Prescriptions Disp Refills    lisinopril (PRINIVIL;ZESTRIL) 10 MG tablet [Pharmacy Med Name: Lisinopril 10 MG Oral Tablet] 100 tablet 2     Sig: TAKE 1 TABLET BY MOUTH DAILY

## 2025-07-01 ENCOUNTER — TRANSCRIBE ORDERS (OUTPATIENT)
Facility: HOSPITAL | Age: 75
End: 2025-07-01

## 2025-07-01 DIAGNOSIS — M65.932 EXTENSOR TENOSYNOVITIS OF LEFT WRIST: Primary | ICD-10-CM

## 2025-07-01 NOTE — PROGRESS NOTES
Physician Progress Note      PATIENT:               LEONEL LOW  CSN #:                  [id number]  :                       1950  ADMIT DATE:       2025 5:41 AM  DISCH DATE:        2025 1:15 PM  RESPONDING  PROVIDER #:        Vince Mckay MD          QUERY TEXT:    Acute respiratory failure Acute Respiratory Failure with hypoxia is documented   in DS on  by Vince Mckay MD ) No accessory muscle use,rales . Please   provide additional clinical indicators supportive of your documentation. Or   please document if the diagnosis of acute respiratory failure has been ruled   out after study.    The clinical indicators include:  This 75 yr female admitted with pneumonia    -\"Acute resp failure/hypoxia: O2 sat was 87% on RA on admission.  Likely from   pneumonia.  Chest CT neg for PE.  Received DuoNebs and prednisone.  Hypoxia   resolved and she is breathing well, maintaining O2 saturation on room air, No   accessory muscle use, clear breath sounds without wheezes rales or rhonchi\"(DS   on  by Vince Mckay MD)  -Well-appearing; well-nourished; in mild distress,Coarse breath sound with   bibasilar wheezing ,Acute respiratory failure with hypoxia(ED on  by  Stanislaw Felipe MD)    -' Acute resp failure/hypoxia: O2 sat was 87% on RA on admission.  Likely from   pneumonia.  Chest CT neg for PE.  Will cont O2, wean as tolerated  \"\" No accessory muscle use, some rhonchi at bases,no acute distress\"(H&P on    by Omid Villanueva MD)    -In Epic    on  SPO2-88% ,93%,97% RR-23,25,20 NC-2L  on 6/10 SPO2-99%,95%,100%,97% RR-16  on  SPO2-95%,97%,100% NC-16,18,14  on  SPO2-95%,96%,100% NC-16,14,18    -albuterol 5mg / ipratropium 0.5mg nebulization (medication list),SPO2   monitoring,Pulmonology consultation    Thank you,  Araseli BE CDS  Options provided:  -- Acute Respiratory Failure with hypoxia  as evidenced by, Please document   evidence.  -- Acute Respiratory Failure with hypoxia ruled out

## 2025-07-03 ENCOUNTER — HOSPITAL ENCOUNTER (OUTPATIENT)
Facility: HOSPITAL | Age: 75
Discharge: HOME | End: 2025-07-03
Payer: MEDICARE

## 2025-07-03 ENCOUNTER — TRANSCRIBE ORDERS (OUTPATIENT)
Facility: HOSPITAL | Age: 75
End: 2025-07-03

## 2025-07-03 DIAGNOSIS — J18.1 LOBAR PNEUMONIA: Primary | ICD-10-CM

## 2025-07-03 DIAGNOSIS — J18.1 LOBAR PNEUMONIA: ICD-10-CM

## 2025-07-03 PROCEDURE — 71046 X-RAY EXAM CHEST 2 VIEWS: CPT

## 2025-08-07 ENCOUNTER — HOSPITAL ENCOUNTER (OUTPATIENT)
Facility: HOSPITAL | Age: 75
Discharge: HOME | End: 2025-08-10
Attending: ORTHOPAEDIC SURGERY
Payer: MEDICARE

## 2025-08-07 DIAGNOSIS — M65.932 EXTENSOR TENOSYNOVITIS OF LEFT WRIST: ICD-10-CM

## 2025-08-07 PROCEDURE — 73221 MRI JOINT UPR EXTREM W/O DYE: CPT

## 2025-08-28 RX ORDER — ROSUVASTATIN CALCIUM 20 MG/1
20 TABLET, COATED ORAL NIGHTLY
Qty: 90 TABLET | Refills: 3 | OUTPATIENT
Start: 2025-08-28

## (undated) DEVICE — ADULT SPO2 SENSOR,REMANUFACTURED,REPROCESSED DEVICE FOR SINGLE USE; REPROCESSED BY COVIDIEN LLC: Brand: NELLCOR

## (undated) DEVICE — CUFF RMFG BP INF SZ 11 DISP -- LAWSON OEM ITEM 238915

## (undated) DEVICE — BAG BELONG PT PERS CLEAR HANDL

## (undated) DEVICE — Device

## (undated) DEVICE — 3M™ CUROS™ DISINFECTING CAP FOR NEEDLELESS CONNECTORS 270/CARTON 20 CARTONS/CASE CFF1-270: Brand: CUROS™

## (undated) DEVICE — CANN NASAL O2 CAPNOGRAPHY AD -- FILTERLINE

## (undated) DEVICE — SOLIDIFIER MEDC 1200ML -- CONVERT TO 356117

## (undated) DEVICE — SYRINGE 50ML E/T

## (undated) DEVICE — ELECTRODE,RADIOTRANSLUCENT,FOAM,3PK: Brand: MEDLINE

## (undated) DEVICE — SET GRAV CK VLV NEEDLESS ST 3 GANGED 4WAY STPCOCK HI FLO 10

## (undated) DEVICE — KIT COLON W/ 1.1OZ LUB AND 2 END

## (undated) DEVICE — CATH IV AUTOGRD BC BLU 22GA 25 -- INSYTE

## (undated) DEVICE — SIMPLICITY FLUFF UNDERPAD 23X36, MODERATE: Brand: SIMPLICITY

## (undated) DEVICE — 1200 GUARD II KIT W/5MM TUBE W/O VAC TUBE: Brand: GUARDIAN